# Patient Record
Sex: FEMALE | Race: WHITE | NOT HISPANIC OR LATINO | Employment: FULL TIME | URBAN - METROPOLITAN AREA
[De-identification: names, ages, dates, MRNs, and addresses within clinical notes are randomized per-mention and may not be internally consistent; named-entity substitution may affect disease eponyms.]

---

## 2018-01-01 ENCOUNTER — HOSPITAL ENCOUNTER (INPATIENT)
Facility: HOSPITAL | Age: 58
LOS: 2 days | Discharge: HOME | DRG: 871 | End: 2018-01-03
Attending: EMERGENCY MEDICINE | Admitting: FAMILY MEDICINE
Payer: COMMERCIAL

## 2018-01-01 DIAGNOSIS — J18.9 PNEUMONIA OF RIGHT MIDDLE LOBE DUE TO INFECTIOUS ORGANISM: Primary | ICD-10-CM

## 2018-01-01 DIAGNOSIS — R05.9 COUGH: ICD-10-CM

## 2018-01-01 DIAGNOSIS — R09.02 HYPOXIA: ICD-10-CM

## 2018-01-01 PROBLEM — Z72.0 TOBACCO ABUSE: Status: ACTIVE | Noted: 2018-01-01

## 2018-01-01 PROBLEM — A41.9 SEPSIS: Status: ACTIVE | Noted: 2018-01-01

## 2018-01-01 LAB
ALBUMIN SERPL BCP-MCNC: 3.6 G/DL
ALLENS TEST: ABNORMAL
ALP SERPL-CCNC: 110 U/L
ALT SERPL W/O P-5'-P-CCNC: 15 U/L
ANION GAP SERPL CALC-SCNC: 12 MMOL/L
AST SERPL-CCNC: 23 U/L
BASOPHILS # BLD AUTO: 0.01 K/UL
BASOPHILS NFR BLD: 0.1 %
BILIRUB SERPL-MCNC: 0.6 MG/DL
BILIRUB UR QL STRIP: NEGATIVE
BNP SERPL-MCNC: <10 PG/ML
BUN SERPL-MCNC: 17 MG/DL
CALCIUM SERPL-MCNC: 9.1 MG/DL
CHLORIDE SERPL-SCNC: 99 MMOL/L
CLARITY UR: CLEAR
CO2 SERPL-SCNC: 27 MMOL/L
COLOR UR: YELLOW
CREAT SERPL-MCNC: 1.1 MG/DL
DELSYS: ABNORMAL
DIFFERENTIAL METHOD: ABNORMAL
EOSINOPHIL # BLD AUTO: 0 K/UL
EOSINOPHIL NFR BLD: 0 %
ERYTHROCYTE [DISTWIDTH] IN BLOOD BY AUTOMATED COUNT: 13.2 %
EST. GFR  (AFRICAN AMERICAN): >60 ML/MIN/1.73 M^2
EST. GFR  (NON AFRICAN AMERICAN): 57 ML/MIN/1.73 M^2
FIO2: 21
FLUAV AG SPEC QL IA: NEGATIVE
FLUBV AG SPEC QL IA: NEGATIVE
GLUCOSE SERPL-MCNC: 139 MG/DL
GLUCOSE UR QL STRIP: NEGATIVE
HCO3 UR-SCNC: 21.3 MMOL/L (ref 24–28)
HCT VFR BLD AUTO: 50.2 %
HGB BLD-MCNC: 16.4 G/DL
HGB UR QL STRIP: ABNORMAL
KETONES UR QL STRIP: ABNORMAL
LACTATE SERPL-SCNC: 1 MMOL/L
LEUKOCYTE ESTERASE UR QL STRIP: NEGATIVE
LYMPHOCYTES # BLD AUTO: 1.3 K/UL
LYMPHOCYTES NFR BLD: 14 %
MCH RBC QN AUTO: 30.4 PG
MCHC RBC AUTO-ENTMCNC: 32.7 G/DL
MCV RBC AUTO: 93 FL
MODE: ABNORMAL
MONOCYTES # BLD AUTO: 0.6 K/UL
MONOCYTES NFR BLD: 6.2 %
NEUTROPHILS # BLD AUTO: 7.4 K/UL
NEUTROPHILS NFR BLD: 79.7 %
NITRITE UR QL STRIP: NEGATIVE
PCO2 BLDA: 30.1 MMHG (ref 35–45)
PH SMN: 7.46 [PH] (ref 7.35–7.45)
PH UR STRIP: 5 [PH] (ref 5–8)
PLATELET # BLD AUTO: 132 K/UL
PMV BLD AUTO: 10.9 FL
PO2 BLDA: 46 MMHG (ref 80–100)
POC BE: -3 MMOL/L
POC SATURATED O2: 84 % (ref 95–100)
POTASSIUM SERPL-SCNC: 3.8 MMOL/L
PROT SERPL-MCNC: 7.9 G/DL
PROT UR QL STRIP: NEGATIVE
RBC # BLD AUTO: 5.4 M/UL
SAMPLE: ABNORMAL
SITE: ABNORMAL
SODIUM SERPL-SCNC: 138 MMOL/L
SP GR UR STRIP: <=1.005 (ref 1–1.03)
SPECIMEN SOURCE: NORMAL
TROPONIN I SERPL DL<=0.01 NG/ML-MCNC: <0.006 NG/ML
URN SPEC COLLECT METH UR: ABNORMAL
UROBILINOGEN UR STRIP-ACNC: NEGATIVE EU/DL
WBC # BLD AUTO: 9.24 K/UL

## 2018-01-01 PROCEDURE — 96365 THER/PROPH/DIAG IV INF INIT: CPT

## 2018-01-01 PROCEDURE — 84484 ASSAY OF TROPONIN QUANT: CPT

## 2018-01-01 PROCEDURE — 99285 EMERGENCY DEPT VISIT HI MDM: CPT | Mod: 25

## 2018-01-01 PROCEDURE — 81003 URINALYSIS AUTO W/O SCOPE: CPT

## 2018-01-01 PROCEDURE — 25000003 PHARM REV CODE 250: Performed by: EMERGENCY MEDICINE

## 2018-01-01 PROCEDURE — 99900035 HC TECH TIME PER 15 MIN (STAT)

## 2018-01-01 PROCEDURE — 93010 ELECTROCARDIOGRAM REPORT: CPT | Mod: ,,, | Performed by: INTERNAL MEDICINE

## 2018-01-01 PROCEDURE — 63600175 PHARM REV CODE 636 W HCPCS: Performed by: EMERGENCY MEDICINE

## 2018-01-01 PROCEDURE — 25500020 PHARM REV CODE 255: Performed by: EMERGENCY MEDICINE

## 2018-01-01 PROCEDURE — 82803 BLOOD GASES ANY COMBINATION: CPT

## 2018-01-01 PROCEDURE — 83880 ASSAY OF NATRIURETIC PEPTIDE: CPT

## 2018-01-01 PROCEDURE — 96375 TX/PRO/DX INJ NEW DRUG ADDON: CPT

## 2018-01-01 PROCEDURE — 80053 COMPREHEN METABOLIC PANEL: CPT

## 2018-01-01 PROCEDURE — 85025 COMPLETE CBC W/AUTO DIFF WBC: CPT

## 2018-01-01 PROCEDURE — 96361 HYDRATE IV INFUSION ADD-ON: CPT

## 2018-01-01 PROCEDURE — 36415 COLL VENOUS BLD VENIPUNCTURE: CPT

## 2018-01-01 PROCEDURE — 27000221 HC OXYGEN, UP TO 24 HOURS

## 2018-01-01 PROCEDURE — 83605 ASSAY OF LACTIC ACID: CPT

## 2018-01-01 PROCEDURE — 94640 AIRWAY INHALATION TREATMENT: CPT

## 2018-01-01 PROCEDURE — 87400 INFLUENZA A/B EACH AG IA: CPT

## 2018-01-01 PROCEDURE — 11000001 HC ACUTE MED/SURG PRIVATE ROOM

## 2018-01-01 PROCEDURE — 87040 BLOOD CULTURE FOR BACTERIA: CPT

## 2018-01-01 PROCEDURE — 36600 WITHDRAWAL OF ARTERIAL BLOOD: CPT

## 2018-01-01 PROCEDURE — 25000003 PHARM REV CODE 250: Performed by: NURSE PRACTITIONER

## 2018-01-01 PROCEDURE — 25000242 PHARM REV CODE 250 ALT 637 W/ HCPCS: Performed by: EMERGENCY MEDICINE

## 2018-01-01 RX ORDER — MONTELUKAST SODIUM 10 MG/1
10 TABLET ORAL
COMMUNITY
Start: 2017-02-15 | End: 2018-01-01

## 2018-01-01 RX ORDER — ATORVASTATIN CALCIUM 10 MG/1
20 TABLET, FILM COATED ORAL DAILY
Status: DISCONTINUED | OUTPATIENT
Start: 2018-01-02 | End: 2018-01-03 | Stop reason: HOSPADM

## 2018-01-01 RX ORDER — IPRATROPIUM BROMIDE AND ALBUTEROL SULFATE 2.5; .5 MG/3ML; MG/3ML
3 SOLUTION RESPIRATORY (INHALATION)
COMMUNITY
Start: 2017-06-21 | End: 2018-06-21

## 2018-01-01 RX ORDER — AZITHROMYCIN 250 MG/1
1000 TABLET, FILM COATED ORAL
Status: DISCONTINUED | OUTPATIENT
Start: 2018-01-01 | End: 2018-01-01

## 2018-01-01 RX ORDER — PANTOPRAZOLE SODIUM 40 MG/1
40 TABLET, DELAYED RELEASE ORAL
COMMUNITY
Start: 2017-02-15 | End: 2018-01-01

## 2018-01-01 RX ORDER — CEFTRIAXONE 1 G/1
1 INJECTION, POWDER, FOR SOLUTION INTRAMUSCULAR; INTRAVENOUS
Status: DISCONTINUED | OUTPATIENT
Start: 2018-01-02 | End: 2018-01-01

## 2018-01-01 RX ORDER — IPRATROPIUM BROMIDE AND ALBUTEROL SULFATE 2.5; .5 MG/3ML; MG/3ML
3 SOLUTION RESPIRATORY (INHALATION) EVERY 6 HOURS
Status: DISCONTINUED | OUTPATIENT
Start: 2018-01-02 | End: 2018-01-03 | Stop reason: HOSPADM

## 2018-01-01 RX ORDER — IPRATROPIUM BROMIDE AND ALBUTEROL SULFATE 2.5; .5 MG/3ML; MG/3ML
3 SOLUTION RESPIRATORY (INHALATION)
Status: COMPLETED | OUTPATIENT
Start: 2018-01-01 | End: 2018-01-01

## 2018-01-01 RX ORDER — METHYLPREDNISOLONE SOD SUCC 125 MG
80 VIAL (EA) INJECTION EVERY 8 HOURS
Status: DISCONTINUED | OUTPATIENT
Start: 2018-01-01 | End: 2018-01-03

## 2018-01-01 RX ORDER — CEFTRIAXONE 1 G/1
1 INJECTION, POWDER, FOR SOLUTION INTRAMUSCULAR; INTRAVENOUS
Status: COMPLETED | OUTPATIENT
Start: 2018-01-01 | End: 2018-01-01

## 2018-01-01 RX ORDER — ARFORMOTEROL TARTRATE 15 UG/2ML
15 SOLUTION RESPIRATORY (INHALATION) 2 TIMES DAILY
Status: DISCONTINUED | OUTPATIENT
Start: 2018-01-01 | End: 2018-01-03 | Stop reason: HOSPADM

## 2018-01-01 RX ORDER — ALBUTEROL SULFATE 90 UG/1
2 AEROSOL, METERED RESPIRATORY (INHALATION)
COMMUNITY
Start: 2017-06-15 | End: 2018-01-01

## 2018-01-01 RX ORDER — DEXTROSE 4 G
TABLET,CHEWABLE ORAL
COMMUNITY
Start: 2017-03-16 | End: 2021-09-21

## 2018-01-01 RX ORDER — ACETAMINOPHEN 325 MG/1
650 TABLET ORAL EVERY 8 HOURS PRN
Status: DISCONTINUED | OUTPATIENT
Start: 2018-01-01 | End: 2018-01-03

## 2018-01-01 RX ORDER — DEXTROSE 4 G
TABLET,CHEWABLE ORAL
COMMUNITY
Start: 2017-03-17 | End: 2018-01-01

## 2018-01-01 RX ORDER — GUAIFENESIN 600 MG/1
600 TABLET, EXTENDED RELEASE ORAL 2 TIMES DAILY
Status: DISCONTINUED | OUTPATIENT
Start: 2018-01-01 | End: 2018-01-03 | Stop reason: HOSPADM

## 2018-01-01 RX ORDER — MELOXICAM 15 MG/1
TABLET ORAL
COMMUNITY
Start: 2017-04-14 | End: 2018-01-01

## 2018-01-01 RX ORDER — ONDANSETRON 2 MG/ML
4 INJECTION INTRAMUSCULAR; INTRAVENOUS EVERY 12 HOURS PRN
Status: DISCONTINUED | OUTPATIENT
Start: 2018-01-01 | End: 2018-01-03 | Stop reason: HOSPADM

## 2018-01-01 RX ORDER — FAMOTIDINE 20 MG/1
20 TABLET, FILM COATED ORAL 2 TIMES DAILY
Status: DISCONTINUED | OUTPATIENT
Start: 2018-01-01 | End: 2018-01-03 | Stop reason: HOSPADM

## 2018-01-01 RX ORDER — FAMOTIDINE 20 MG/1
20 TABLET, FILM COATED ORAL
COMMUNITY

## 2018-01-01 RX ORDER — ENOXAPARIN SODIUM 100 MG/ML
40 INJECTION SUBCUTANEOUS EVERY 24 HOURS
Status: DISCONTINUED | OUTPATIENT
Start: 2018-01-01 | End: 2018-01-03 | Stop reason: HOSPADM

## 2018-01-01 RX ORDER — ASPIRIN 81 MG/1
81 TABLET ORAL DAILY
Status: DISCONTINUED | OUTPATIENT
Start: 2018-01-02 | End: 2018-01-03 | Stop reason: HOSPADM

## 2018-01-01 RX ORDER — ACETAMINOPHEN 500 MG
1000 TABLET ORAL
Status: COMPLETED | OUTPATIENT
Start: 2018-01-01 | End: 2018-01-01

## 2018-01-01 RX ORDER — TRIAMCINOLONE ACETONIDE 40 MG/ML
40 INJECTION, SUSPENSION INTRA-ARTICULAR; INTRAMUSCULAR
COMMUNITY
Start: 2017-06-15 | End: 2018-01-01

## 2018-01-01 RX ORDER — FLUTICASONE PROPIONATE AND SALMETEROL 250; 50 UG/1; UG/1
1 POWDER RESPIRATORY (INHALATION)
COMMUNITY
Start: 2017-07-13 | End: 2021-09-21

## 2018-01-01 RX ORDER — BUDESONIDE 0.5 MG/2ML
0.5 INHALANT ORAL EVERY 12 HOURS
Status: DISCONTINUED | OUTPATIENT
Start: 2018-01-01 | End: 2018-01-03 | Stop reason: HOSPADM

## 2018-01-01 RX ORDER — IPRATROPIUM BROMIDE AND ALBUTEROL SULFATE 2.5; .5 MG/3ML; MG/3ML
3 SOLUTION RESPIRATORY (INHALATION) EVERY 6 HOURS
Status: DISCONTINUED | OUTPATIENT
Start: 2018-01-02 | End: 2018-01-01

## 2018-01-01 RX ORDER — LOSARTAN POTASSIUM 100 MG/1
TABLET ORAL
COMMUNITY
Start: 2017-05-19 | End: 2018-01-01

## 2018-01-01 RX ORDER — ASPIRIN 81 MG/1
81 TABLET ORAL DAILY
COMMUNITY
End: 2021-09-21

## 2018-01-01 RX ORDER — SODIUM CHLORIDE 9 MG/ML
INJECTION, SOLUTION INTRAVENOUS CONTINUOUS
Status: ACTIVE | OUTPATIENT
Start: 2018-01-01 | End: 2018-01-03

## 2018-01-01 RX ORDER — METHYLPREDNISOLONE ACETATE 40 MG/ML
40 INJECTION, SUSPENSION INTRA-ARTICULAR; INTRALESIONAL; INTRAMUSCULAR; SOFT TISSUE
COMMUNITY
Start: 2017-06-15 | End: 2018-01-01

## 2018-01-01 RX ORDER — METHYLPREDNISOLONE SOD SUCC 125 MG
125 VIAL (EA) INJECTION
Status: COMPLETED | OUTPATIENT
Start: 2018-01-01 | End: 2018-01-01

## 2018-01-01 RX ADMIN — IOHEXOL 100 ML: 350 INJECTION, SOLUTION INTRAVENOUS at 07:01

## 2018-01-01 RX ADMIN — METHYLPREDNISOLONE SODIUM SUCCINATE 125 MG: 125 INJECTION, POWDER, FOR SOLUTION INTRAMUSCULAR; INTRAVENOUS at 05:01

## 2018-01-01 RX ADMIN — IPRATROPIUM BROMIDE AND ALBUTEROL SULFATE 3 ML: .5; 3 SOLUTION RESPIRATORY (INHALATION) at 05:01

## 2018-01-01 RX ADMIN — SODIUM CHLORIDE: 0.9 INJECTION, SOLUTION INTRAVENOUS at 09:01

## 2018-01-01 RX ADMIN — AZITHROMYCIN MONOHYDRATE 500 MG: 500 INJECTION, POWDER, LYOPHILIZED, FOR SOLUTION INTRAVENOUS at 09:01

## 2018-01-01 RX ADMIN — CEFTRIAXONE SODIUM 1 G: 1 INJECTION, POWDER, FOR SOLUTION INTRAMUSCULAR; INTRAVENOUS at 09:01

## 2018-01-01 RX ADMIN — ACETAMINOPHEN 1000 MG: 500 TABLET ORAL at 05:01

## 2018-01-01 NOTE — ED PROVIDER NOTES
"SCRIBE #1 NOTE: I, Ana Lilia Duke, am scribing for, and in the presence of, Tomas Alicea MD. I have scribed the HPI, ROS, and PEx.    SCRIBE #2 NOTE: I, Lisa Huntley, am scribing for, and in the presence of,  Star Chapa MD. I have scribed the remaining portions of the note not scribed by Scribe #1.     History      Chief Complaint   Patient presents with    Cough     body aches, cough, fever x 1 week       Review of patient's allergies indicates:   Allergen Reactions    Bactrim [sulfamethoxazole-trimethoprim] Other (See Comments)     Chills, fever    Vicodin [hydrocodone-acetaminophen] Itching        HPI   HPI    2018, 5:32 PM   History obtained from the patient      History of Present Illness: Mallika Corbin is a 57 y.o. female patient with PMHx of asthma  presents to the Emergency Department for cough which onset gradually a few days ago. Symptoms are intermittent and moderate in severity. Pt states, "My lungs and my kidneys hurt". Pt reports that her breathing treatments at home are not mitigating sxs. No mitigating or exacerbating factors reported. Associated sxs include myalgias, SOB,  and  fever. Patient denies any CP, leg swelling, calf pain, n/v, wheezing, dizziness, back pain, dysuria, chills, diaphoresis, and all other sxs at this time. No further complaints or concerns at this time.     Arrival mode: Personal vehicle    PCP: Douglas Gary MD       Past Medical History:  Past Medical History:   Diagnosis Date    Asthma     Hiatal hernia     High cholesterol     Mitral valve prolapse     Reflux        Past Surgical History:  Past Surgical History:   Procedure Laterality Date    APPENDECTOMY       SECTION      pyloric stenosis      infant    TOTAL ABDOMINAL HYSTERECTOMY      ovaries in place still.         Family History:  Family History   Problem Relation Age of Onset    Cancer Father     Hypertension Father     Diabetes Father     Diabetes Mother     Hypertension " Mother     Cancer Maternal Grandmother        Social History:  Social History     Social History Main Topics    Smoking status: Current Every Day Smoker     Packs/day: 0.25    Smokeless tobacco: Never Used      Comment: tring to wean self off    Alcohol use No    Drug use: No    Sexual activity: Not given       ROS   Review of Systems   Constitutional: Positive for fever. Negative for chills.   HENT: Negative for sore throat.    Respiratory: Positive for cough and shortness of breath. Negative for wheezing.    Cardiovascular: Negative for chest pain.   Gastrointestinal: Negative for abdominal pain, nausea and vomiting.   Genitourinary: Negative for dysuria.   Musculoskeletal: Positive for myalgias. Negative for back pain.   Skin: Negative for rash.   Neurological: Negative for dizziness, weakness and headaches.   Hematological: Does not bruise/bleed easily.   All other systems reviewed and are negative.    Physical Exam      Initial Vitals [01/01/18 1640]   BP Pulse Resp Temp SpO2   116/74 (!) 127 (!) 24 (!) 100.7 °F (38.2 °C) (!) 88 %      MAP       88          Physical Exam  Nursing Notes and Vital Signs Reviewed.  Constitutional: Patient is in no apparent distress. Well-developed and well-nourished.  Head: Atraumatic. Normocephalic.  Eyes: PERRL. EOM intact. Conjunctivae are not pale. No scleral icterus.  ENT: Mucous membranes are moist. Oropharynx is clear and symmetric.    Neck: Supple. Full ROM. No lymphadenopathy.  Cardiovascular: Regular rate. Regular rhythm. No murmurs, rubs, or gallops. Distal pulses are 2+ and symmetric.  Pulmonary/Chest: No respiratory distress. Clear to auscultation bilaterally. No wheezing or rales.  Abdominal: Soft and non-distended.  There is no tenderness.  No rebound, guarding, or rigidity. Good bowel sounds.  Genitourinary: No CVA tenderness  Musculoskeletal: Moves all extremities. No obvious deformities. No edema. No calf tenderness.  Skin: Warm and dry.  Neurological:   "Alert, awake, and appropriate.  Normal speech.  No acute focal neurological deficits are appreciated.  Psychiatric: Normal affect. Good eye contact. Appropriate in content.    ED Course    Procedures  ED Vital Signs:  Vitals:    01/01/18 1640 01/01/18 1724 01/01/18 1730 01/01/18 1744   BP: 116/74  115/74    Pulse: (!) 127 (!) 116 (!) 115 (!) 115   Resp: (!) 24  (!) 21 (!) 21   Temp: (!) 100.7 °F (38.2 °C)      TempSrc: Oral      SpO2: (!) 88%  (!) 90% (!) 88%   Weight: 79 kg (174 lb 2.6 oz)      Height: 5' 3" (1.6 m)       01/01/18 1749 01/01/18 1755 01/01/18 1935 01/01/18 2116   BP:    (!) 107/58   Pulse: (!) 111 (!) 120 (!) 116 109   Resp: (!) 23 19 19 17   Temp:       TempSrc:       SpO2: (!) 91% (!) 91% (!) 91% (!) 89%   Weight:       Height:           Abnormal Lab Results:  Labs Reviewed   CBC W/ AUTO DIFFERENTIAL - Abnormal; Notable for the following:        Result Value    Hemoglobin 16.4 (*)     Hematocrit 50.2 (*)     Platelets 132 (*)     Gran% 79.7 (*)     Lymph% 14.0 (*)     All other components within normal limits   COMPREHENSIVE METABOLIC PANEL - Abnormal; Notable for the following:     Glucose 139 (*)     eGFR if non  57 (*)     All other components within normal limits   URINALYSIS - Abnormal; Notable for the following:     Specific Gravity, UA <=1.005 (*)     Ketones, UA 2+ (*)     Occult Blood UA Trace (*)     All other components within normal limits   ISTAT PROCEDURE - Abnormal; Notable for the following:     POC PH 7.458 (*)     POC PCO2 30.1 (*)     POC PO2 46 (*)     POC HCO3 21.3 (*)     POC SATURATED O2 84 (*)     All other components within normal limits   CULTURE, BLOOD   CULTURE, BLOOD   CULTURE, RESPIRATORY   INFLUENZA A AND B ANTIGEN   LACTIC ACID, PLASMA   TROPONIN I   B-TYPE NATRIURETIC PEPTIDE        All Lab Results:  Results for orders placed or performed during the hospital encounter of 01/01/18   Influenza antigen Nasopharyngeal Swab   Result Value Ref Range    " Influenza A Ag, EIA Negative Negative    Influenza B Ag, EIA Negative Negative    Flu A & B Source Nasopharyngeal Swab    CBC auto differential   Result Value Ref Range    WBC 9.24 3.90 - 12.70 K/uL    RBC 5.40 4.00 - 5.40 M/uL    Hemoglobin 16.4 (H) 12.0 - 16.0 g/dL    Hematocrit 50.2 (H) 37.0 - 48.5 %    MCV 93 82 - 98 fL    MCH 30.4 27.0 - 31.0 pg    MCHC 32.7 32.0 - 36.0 g/dL    RDW 13.2 11.5 - 14.5 %    Platelets 132 (L) 150 - 350 K/uL    MPV 10.9 9.2 - 12.9 fL    Gran # 7.4 1.8 - 7.7 K/uL    Lymph # 1.3 1.0 - 4.8 K/uL    Mono # 0.6 0.3 - 1.0 K/uL    Eos # 0.0 0.0 - 0.5 K/uL    Baso # 0.01 0.00 - 0.20 K/uL    Gran% 79.7 (H) 38.0 - 73.0 %    Lymph% 14.0 (L) 18.0 - 48.0 %    Mono% 6.2 4.0 - 15.0 %    Eosinophil% 0.0 0.0 - 8.0 %    Basophil% 0.1 0.0 - 1.9 %    Differential Method Automated    Comprehensive metabolic panel   Result Value Ref Range    Sodium 138 136 - 145 mmol/L    Potassium 3.8 3.5 - 5.1 mmol/L    Chloride 99 95 - 110 mmol/L    CO2 27 23 - 29 mmol/L    Glucose 139 (H) 70 - 110 mg/dL    BUN, Bld 17 6 - 20 mg/dL    Creatinine 1.1 0.5 - 1.4 mg/dL    Calcium 9.1 8.7 - 10.5 mg/dL    Total Protein 7.9 6.0 - 8.4 g/dL    Albumin 3.6 3.5 - 5.2 g/dL    Total Bilirubin 0.6 0.1 - 1.0 mg/dL    Alkaline Phosphatase 110 55 - 135 U/L    AST 23 10 - 40 U/L    ALT 15 10 - 44 U/L    Anion Gap 12 8 - 16 mmol/L    eGFR if African American >60 >60 mL/min/1.73 m^2    eGFR if non African American 57 (A) >60 mL/min/1.73 m^2   Lactic acid, plasma   Result Value Ref Range    Lactate (Lactic Acid) 1.0 0.5 - 2.2 mmol/L   Troponin I   Result Value Ref Range    Troponin I <0.006 0.000 - 0.026 ng/mL   B-Type natriuretic peptide (BNP)   Result Value Ref Range    BNP <10 0 - 99 pg/mL   Urinalysis   Result Value Ref Range    Specimen UA Urine, Clean Catch     Color, UA Yellow Yellow, Straw, Felisa    Appearance, UA Clear Clear    pH, UA 5.0 5.0 - 8.0    Specific Gravity, UA <=1.005 (A) 1.005 - 1.030    Protein, UA Negative Negative     Glucose, UA Negative Negative    Ketones, UA 2+ (A) Negative    Bilirubin (UA) Negative Negative    Occult Blood UA Trace (A) Negative    Nitrite, UA Negative Negative    Urobilinogen, UA Negative <2.0 EU/dL    Leukocytes, UA Negative Negative   ISTAT PROCEDURE   Result Value Ref Range    POC PH 7.458 (H) 7.35 - 7.45    POC PCO2 30.1 (L) 35 - 45 mmHg    POC PO2 46 (LL) 80 - 100 mmHg    POC HCO3 21.3 (L) 24 - 28 mmol/L    POC BE -3 -2 to 2 mmol/L    POC SATURATED O2 84 (L) 95 - 100 %    Sample ARTERIAL     Site LB     Allens Test N/A     DelSys Room Air     Mode SPONT     FiO2 21          Imaging Results:  Imaging Results          CTA Chest Non-Coronary (PE Study) (In process)                X-Ray Chest PA And Lateral (Final result)  Result time 01/01/18 17:15:12    Final result by Rj Lorenzo MD (01/01/18 17:15:12)                 Impression:     Focal linear infiltrate/atelectasis in the anteromedial right middle lobe.      Electronically signed by: RJ LORENZO MD  Date:     01/01/18  Time:    17:15              Narrative:    History: Cough      No prior studies.    Normal heart size. There is small opacity in the right infrahilar region on PA view which appears to correspond to a linear infiltrate/atelectasis in the anterior middle lobe on lateral view.                           CT Chest w/ Intravenous Contrast read via statrad:  Impression:  1. Area of  consolidation is seen involving the R middle lobe. Additional small areas of consolidation/groundglass  Opacification seen throughout both lungs. Constellation of findings are suggestive of multifocal pneumonia.   2. Mild bronchial wall thickening is noted, which may represent inflammatory airways disease versus mild pulmonary edema.  3. No evidence of PE.    The EKG was ordered, reviewed, and independently interpreted by the ED provider.  Interpretation time: 17:22  Rate: 122 BPM  Rhythm: sinus tachycardia  Interpretation: ST and T wave abnormality.  No STEMI.           The Emergency Provider reviewed the vital signs and test results, which are outlined above.    ED Discussion   8:42 PM: Discussed case with Osmany ([facility]). Dr. John Wade agrees with current care and management of pt and accepts admission.   Admitting Service: [facility]   Admitting Physician: Dr. John Wade  Admit to: Med Surg    8:42 PM: Re-evaluated pt. I have discussed test results, shared treatment plan, and the need for admission with patient and family at bedside. Pt and family express understanding at this time and agree with all information. All questions answered. Pt and family have no further questions or concerns at this time. Pt is ready for admit.        ED Medication(s):  Medications   ondansetron injection 4 mg (not administered)   promethazine (PHENERGAN) 6.25 mg in dextrose 5 % 50 mL IVPB (not administered)   acetaminophen tablet 650 mg (not administered)   aspirin EC tablet 81 mg (not administered)   atorvastatin tablet 20 mg (not administered)   0.9%  NaCl infusion ( Intravenous New Bag 1/1/18 2106)   enoxaparin injection 40 mg (not administered)   cefTRIAXone injection 1 g (not administered)     And   azithromycin 500 mg in dextrose 5 % 250 mL IVPB (ready to mix system) (not administered)   famotidine tablet 20 mg (not administered)   guaiFENesin 12 hr tablet 600 mg (not administered)   methylPREDNISolone sodium succinate injection 80 mg (not administered)   budesonide nebulizer solution 0.5 mg (not administered)   arformoterol nebulizer solution 15 mcg (not administered)   albuterol-ipratropium 2.5mg-0.5mg/3mL nebulizer solution 3 mL (not administered)   albuterol-ipratropium 2.5mg-0.5mg/3mL nebulizer solution 3 mL (3 mLs Nebulization Given 1/1/18 1755)   methylPREDNISolone sodium succinate injection 125 mg (125 mg Intravenous Given 1/1/18 1723)   acetaminophen tablet 1,000 mg (1,000 mg Oral Given 1/1/18 1723)   cefTRIAXone injection 1 g (1 g Intravenous Given  1/1/18 2104)   azithromycin 500 mg in dextrose 5 % 250 mL IVPB (ready to mix system) (500 mg Intravenous New Bag 1/1/18 2111)   omnipaque 350 iohexol 100 mL (100 mLs Intravenous Given 1/1/18 1924)       New Prescriptions    No medications on file             Medical Decision Making    Medical Decision Making:   Clinical Tests:   Lab Tests: Reviewed and Ordered  Radiological Study: Reviewed and Ordered  Medical Tests: Reviewed and Ordered           Scribe Attestation:   Scribe #1: I performed the above scribed service and the documentation accurately describes the services I performed. I attest to the accuracy of the note.    Attending:   Physician Attestation Statement for Scribe #1: I, Tomas Alicea MD, personally performed the services described in this documentation, as scribed by Ana Lilia Duke, in my presence, and it is both accurate and complete.       Scribe Attestation:   Scribe #2: I performed the above scribed service and the documentation accurately describes the services I performed. I attest to the accuracy of the note.    Attending Attestation:           Physician Attestation for Scribe:    Physician Attestation Statement for Scribe #2: I, Star Chapa MD, reviewed documentation, as scribed by Lisa Huntley in my presence, and it is both accurate and complete. I also acknowledge and confirm the content of the note done by Scribe #1.          Clinical Impression       ICD-10-CM ICD-9-CM   1. Pneumonia of right middle lobe due to infectious organism J18.1 486   2. Cough R05 786.2   3. Hypoxia R09.02 799.02       Disposition:   Disposition: Admitted  Condition: Fair           Star Chapa MD  01/01/18 0877

## 2018-01-01 NOTE — ED NOTES
Patient c/o SOB with nonproductive cough x 1 week, also c/o body aches.    Patient moved to ED room 09, patient assisted onto stretcher and changed into a gown. Patient placed on cardiac monitor, continuous pulse oximetry and automatic blood pressure cuff. Bed placed in low locked position, side rails up x 2, call light is within reach of patient or family, orientation to room and explanation of wait provided to family and patient, alarms set and turned on for monitor and pulse ox, awaiting MD evaluation and orders, will continue to monitor.    Patient identifies self as Mallika Corbin      LOC: The patient is awake, alert and aware of environment with an appropriate affect, the patient is oriented x 3 and speaking appropriately.  APPEARANCE: Patient resting comfortably and in no acute distress, patient is clean and well groomed, patient's clothing is properly fastened.  SKIN: The skin is warm and dry, color consistent with ethnicity, patient has normal skin turgor and moist mucus membranes, skin intact, no breakdown or bruising noted.  MUSCULOSKELETAL: Patient moving all extremities well, no obvious swelling or deformities noted.  RESPIRATORY: Airway is open and patent, respirations are spontaneous, patient has a increased effort and rate, no accessory muscle use noted.  CARDIAC: Patient has a increased rate and sinus rhythm, no periphreal edema noted, capillary refill < 3 seconds.  ABDOMEN: Soft and non tender to palpation, no distention noted.  NEUROLOGIC: PERRL, 3 mm bilaterally, eyes open spontaneously, behavior appropriate to situation, follows commands, facial expression symmetrical, bilateral hand grasp equal and even, purposeful motor response noted, normal sensation in all extremities when touched with a finger.

## 2018-01-02 ENCOUNTER — TELEPHONE (OUTPATIENT)
Dept: PHARMACY | Facility: CLINIC | Age: 58
End: 2018-01-02

## 2018-01-02 PROBLEM — N61.1 ABSCESS OF BREAST: Status: ACTIVE | Noted: 2018-01-02

## 2018-01-02 PROBLEM — J45.901 ASTHMA WITH ACUTE EXACERBATION: Status: ACTIVE | Noted: 2018-01-02

## 2018-01-02 PROBLEM — T14.8XXA WOUND OF SKIN: Status: ACTIVE | Noted: 2018-01-02

## 2018-01-02 LAB
ANION GAP SERPL CALC-SCNC: 11 MMOL/L
BASOPHILS # BLD AUTO: 0.01 K/UL
BASOPHILS NFR BLD: 0.1 %
BUN SERPL-MCNC: 15 MG/DL
CALCIUM SERPL-MCNC: 8.5 MG/DL
CHLORIDE SERPL-SCNC: 104 MMOL/L
CO2 SERPL-SCNC: 21 MMOL/L
CREAT SERPL-MCNC: 0.8 MG/DL
DIFFERENTIAL METHOD: ABNORMAL
EOSINOPHIL # BLD AUTO: 0 K/UL
EOSINOPHIL NFR BLD: 0 %
ERYTHROCYTE [DISTWIDTH] IN BLOOD BY AUTOMATED COUNT: 13.1 %
EST. GFR  (AFRICAN AMERICAN): >60 ML/MIN/1.73 M^2
EST. GFR  (NON AFRICAN AMERICAN): >60 ML/MIN/1.73 M^2
ESTIMATED AVG GLUCOSE: 151 MG/DL
GLUCOSE SERPL-MCNC: 339 MG/DL
HBA1C MFR BLD HPLC: 6.9 %
HCT VFR BLD AUTO: 41.5 %
HGB BLD-MCNC: 13.8 G/DL
LYMPHOCYTES # BLD AUTO: 0.6 K/UL
LYMPHOCYTES NFR BLD: 5.9 %
MAGNESIUM SERPL-MCNC: 1.9 MG/DL
MCH RBC QN AUTO: 30.7 PG
MCHC RBC AUTO-ENTMCNC: 33.3 G/DL
MCV RBC AUTO: 92 FL
MONOCYTES # BLD AUTO: 0.2 K/UL
MONOCYTES NFR BLD: 2 %
NEUTROPHILS # BLD AUTO: 9.1 K/UL
NEUTROPHILS NFR BLD: 92 %
PHOSPHATE SERPL-MCNC: 2.4 MG/DL
PLATELET # BLD AUTO: 117 K/UL
PMV BLD AUTO: 10.8 FL
POTASSIUM SERPL-SCNC: 4.1 MMOL/L
PROCALCITONIN SERPL IA-MCNC: 0.34 NG/ML
RBC # BLD AUTO: 4.49 M/UL
SODIUM SERPL-SCNC: 136 MMOL/L
WBC # BLD AUTO: 9.88 K/UL

## 2018-01-02 PROCEDURE — 36415 COLL VENOUS BLD VENIPUNCTURE: CPT

## 2018-01-02 PROCEDURE — 11000001 HC ACUTE MED/SURG PRIVATE ROOM

## 2018-01-02 PROCEDURE — 83735 ASSAY OF MAGNESIUM: CPT

## 2018-01-02 PROCEDURE — 27000221 HC OXYGEN, UP TO 24 HOURS

## 2018-01-02 PROCEDURE — 84145 PROCALCITONIN (PCT): CPT

## 2018-01-02 PROCEDURE — 94640 AIRWAY INHALATION TREATMENT: CPT

## 2018-01-02 PROCEDURE — 80048 BASIC METABOLIC PNL TOTAL CA: CPT

## 2018-01-02 PROCEDURE — 25000003 PHARM REV CODE 250: Performed by: NURSE PRACTITIONER

## 2018-01-02 PROCEDURE — 85025 COMPLETE CBC W/AUTO DIFF WBC: CPT

## 2018-01-02 PROCEDURE — 84100 ASSAY OF PHOSPHORUS: CPT

## 2018-01-02 PROCEDURE — 83036 HEMOGLOBIN GLYCOSYLATED A1C: CPT

## 2018-01-02 PROCEDURE — 25000003 PHARM REV CODE 250: Performed by: FAMILY MEDICINE

## 2018-01-02 PROCEDURE — 63600175 PHARM REV CODE 636 W HCPCS: Performed by: FAMILY MEDICINE

## 2018-01-02 PROCEDURE — 94761 N-INVAS EAR/PLS OXIMETRY MLT: CPT

## 2018-01-02 PROCEDURE — 25000242 PHARM REV CODE 250 ALT 637 W/ HCPCS: Performed by: NURSE PRACTITIONER

## 2018-01-02 PROCEDURE — 63600175 PHARM REV CODE 636 W HCPCS: Performed by: NURSE PRACTITIONER

## 2018-01-02 RX ADMIN — FAMOTIDINE 20 MG: 20 TABLET, FILM COATED ORAL at 08:01

## 2018-01-02 RX ADMIN — ARFORMOTEROL TARTRATE 15 MCG: 15 SOLUTION RESPIRATORY (INHALATION) at 08:01

## 2018-01-02 RX ADMIN — IPRATROPIUM BROMIDE AND ALBUTEROL SULFATE 3 ML: .5; 3 SOLUTION RESPIRATORY (INHALATION) at 12:01

## 2018-01-02 RX ADMIN — ARFORMOTEROL TARTRATE 15 MCG: 15 SOLUTION RESPIRATORY (INHALATION) at 07:01

## 2018-01-02 RX ADMIN — GUAIFENESIN 600 MG: 600 TABLET, EXTENDED RELEASE ORAL at 09:01

## 2018-01-02 RX ADMIN — METHYLPREDNISOLONE SODIUM SUCCINATE 80 MG: 125 INJECTION, POWDER, FOR SOLUTION INTRAMUSCULAR; INTRAVENOUS at 12:01

## 2018-01-02 RX ADMIN — BUDESONIDE 0.5 MG: 0.5 SUSPENSION RESPIRATORY (INHALATION) at 08:01

## 2018-01-02 RX ADMIN — BUDESONIDE 0.5 MG: 0.5 SUSPENSION RESPIRATORY (INHALATION) at 07:01

## 2018-01-02 RX ADMIN — GUAIFENESIN 600 MG: 600 TABLET, EXTENDED RELEASE ORAL at 12:01

## 2018-01-02 RX ADMIN — ENOXAPARIN SODIUM 40 MG: 100 INJECTION SUBCUTANEOUS at 12:01

## 2018-01-02 RX ADMIN — IPRATROPIUM BROMIDE AND ALBUTEROL SULFATE 3 ML: .5; 3 SOLUTION RESPIRATORY (INHALATION) at 07:01

## 2018-01-02 RX ADMIN — IPRATROPIUM BROMIDE AND ALBUTEROL SULFATE 3 ML: .5; 3 SOLUTION RESPIRATORY (INHALATION) at 01:01

## 2018-01-02 RX ADMIN — ATORVASTATIN CALCIUM 20 MG: 10 TABLET, FILM COATED ORAL at 08:01

## 2018-01-02 RX ADMIN — ENOXAPARIN SODIUM 40 MG: 100 INJECTION SUBCUTANEOUS at 04:01

## 2018-01-02 RX ADMIN — CEFTRIAXONE SODIUM 1000 MG: 1 INJECTION, POWDER, FOR SOLUTION INTRAMUSCULAR; INTRAVENOUS at 09:01

## 2018-01-02 RX ADMIN — IPRATROPIUM BROMIDE AND ALBUTEROL SULFATE 3 ML: .5; 3 SOLUTION RESPIRATORY (INHALATION) at 08:01

## 2018-01-02 RX ADMIN — SODIUM CHLORIDE: 0.9 INJECTION, SOLUTION INTRAVENOUS at 09:01

## 2018-01-02 RX ADMIN — AZITHROMYCIN MONOHYDRATE 500 MG: 500 INJECTION, POWDER, LYOPHILIZED, FOR SOLUTION INTRAVENOUS at 10:01

## 2018-01-02 RX ADMIN — GUAIFENESIN 600 MG: 600 TABLET, EXTENDED RELEASE ORAL at 08:01

## 2018-01-02 RX ADMIN — FAMOTIDINE 20 MG: 20 TABLET, FILM COATED ORAL at 12:01

## 2018-01-02 RX ADMIN — METHYLPREDNISOLONE SODIUM SUCCINATE 80 MG: 125 INJECTION, POWDER, FOR SOLUTION INTRAMUSCULAR; INTRAVENOUS at 03:01

## 2018-01-02 RX ADMIN — METHYLPREDNISOLONE SODIUM SUCCINATE 80 MG: 125 INJECTION, POWDER, FOR SOLUTION INTRAMUSCULAR; INTRAVENOUS at 09:01

## 2018-01-02 RX ADMIN — FAMOTIDINE 20 MG: 20 TABLET, FILM COATED ORAL at 09:01

## 2018-01-02 RX ADMIN — METHYLPREDNISOLONE SODIUM SUCCINATE 80 MG: 125 INJECTION, POWDER, FOR SOLUTION INTRAMUSCULAR; INTRAVENOUS at 05:01

## 2018-01-02 RX ADMIN — ASPIRIN 81 MG: 81 TABLET, COATED ORAL at 08:01

## 2018-01-02 RX ADMIN — BUDESONIDE 0.5 MG: 0.5 SUSPENSION RESPIRATORY (INHALATION) at 12:01

## 2018-01-02 NOTE — H&P
[facility] Hospital Medicine  History & Physical    Patient Name: Mallika Corbin  MRN: [id number]  Admission Date: 2018  Attending Physician: MART PRESCOTT  Primary Care Provider: Douglas Gary MD         Patient information was obtained from patient, past medical records and ER records.     Subjective:     Principal Problem:Sepsis    Chief Complaint:   Chief Complaint   Patient presents with    Cough     body aches, cough, fever x 1 week        HPI: Mallika Corbin is a 57 y.o. female patient with PMHx of asthma  presents to the Emergency Department for cough. Onset few days ago. Symptoms are consent and moderate in severity.Prior treatment at home, nebs not improving symptoms. No mitigating or exacerbating factors reported. Associated sxs include myalgias, SOB,  and  fever. Patient denies any CP, leg swelling, calf pain, n/v, wheezing, dizziness, back pain, dysuria, chills, diaphoresis, and all other sxs at this time. No further complaints or concerns at this time. Patient reports has Never hospitalized for asthma. Has no pulmonary doctor. Smokes daily. CBC, CMP overal stable other than mild hyperglycemia .. Lactic Neg  Urine Neg. ABG note hypoxemia.  CT Chest w/ Intravenous Contrast read via statrad per ER note:  Impression:  1. Area of  consolidation is seen involving the R middle lobe. Additional small areas of consolidation/groundglass  Opacification seen throughout both lungs. Constellation of findings are suggestive of multifocal pneumonia.   2. Mild bronchial wall thickening is noted, which may represent inflammatory airways disease versus mild pulmonary edema.  3. No evidence of PE.  Patient admitted for asthma exacerbation with Sepsis from PNE    Past Medical History:   Diagnosis Date    Asthma     Hiatal hernia     High cholesterol     Mitral valve prolapse     Reflux        Past Surgical History:   Procedure Laterality Date    APPENDECTOMY       SECTION       pyloric stenosis      infant    TOTAL ABDOMINAL HYSTERECTOMY      ovaries in place still.       Review of patient's allergies indicates:   Allergen Reactions    Bactrim [sulfamethoxazole-trimethoprim] Other (See Comments)     Chills, fever    Vicodin [hydrocodone-acetaminophen] Itching       No current facility-administered medications on file prior to encounter.      Current Outpatient Prescriptions on File Prior to Encounter   Medication Sig    albuterol 90 mcg/actuation inhaler Inhale 2 puffs into the lungs daily as needed.    [DISCONTINUED] esomeprazole (NEXIUM) 40 MG capsule Take 40 mg by mouth before breakfast.     Family History     Problem Relation (Age of Onset)    Cancer Father, Maternal Grandmother    Diabetes Father, Mother    Hypertension Father, Mother        Social History Main Topics    Smoking status: Current Every Day Smoker     Packs/day: 0.25    Smokeless tobacco: Never Used      Comment: tring to wean self off    Alcohol use No    Drug use: No    Sexual activity: Not on file     Review of Systems   Constitutional: Positive for fatigue and fever. Negative for chills and diaphoresis.   HENT: Negative for congestion, sore throat and voice change.    Eyes: Negative for photophobia and visual disturbance.   Respiratory: Positive for cough, shortness of breath and wheezing. Negative for stridor.    Cardiovascular: Negative for chest pain and leg swelling.   Gastrointestinal: Negative for abdominal distention, abdominal pain, constipation, diarrhea, nausea and vomiting.   Endocrine: Negative for polydipsia, polyphagia and polyuria.   Genitourinary: Negative for difficulty urinating, dysuria, flank pain, pelvic pain, urgency and vaginal discharge.   Musculoskeletal: Negative for back pain, joint swelling, neck pain and neck stiffness.   Skin: Negative for color change and rash.   Allergic/Immunologic: Negative for immunocompromised state.   Neurological: Negative for dizziness, syncope,  weakness, numbness and headaches.   Hematological: Does not bruise/bleed easily.   Psychiatric/Behavioral: Negative for agitation, behavioral problems and confusion.     Objective:     Vital Signs (Most Recent):  Temp: (!) 100.7 °F (38.2 °C) (01/01/18 1640)  Pulse: (!) 116 (01/01/18 1935)  Resp: 19 (01/01/18 1935)  BP: 115/74 (01/01/18 1730)  SpO2: (!) 91 % (01/01/18 1935) Vital Signs (24h Range):  Temp:  [100.7 °F (38.2 °C)] 100.7 °F (38.2 °C)  Pulse:  [111-127] 116  Resp:  [19-24] 19  SpO2:  [88 %-91 %] 91 %  BP: (115-116)/(74) 115/74     Weight: 79 kg (174 lb 2.6 oz)  Body mass index is 30.85 kg/m².    Physical Exam   Constitutional: She is oriented to person, place, and time. She appears well-developed and well-nourished. She does not have a sickly appearance. She appears ill. No distress.   HENT:   Head: Normocephalic and atraumatic.   Nose: Nose normal.   Eyes: Conjunctivae and EOM are normal. Pupils are equal, round, and reactive to light. No scleral icterus.   Neck: Normal range of motion. Neck supple. No tracheal deviation present.   Cardiovascular: Normal rate, regular rhythm, normal heart sounds and intact distal pulses.    No murmur heard.  Pulmonary/Chest: Effort normal and breath sounds normal. No stridor. No respiratory distress. She has no wheezes. She has no rales.   Abdominal: Soft. Bowel sounds are normal. She exhibits no distension. There is no tenderness. There is no guarding.   Genitourinary:   Genitourinary Comments: deferred   Musculoskeletal: Normal range of motion. She exhibits no edema or deformity.   Neurological: She is alert and oriented to person, place, and time. No cranial nerve deficit.   Skin: Skin is warm and dry. Capillary refill takes 2 to 3 seconds. No rash noted. She is not diaphoretic.   See image   Psychiatric: She has a normal mood and affect. Her behavior is normal. Judgment and thought content normal.   Nursing note and vitals reviewed.        CRANIAL NERVES     CN III,  IV, VI   Pupils are equal, round, and reactive to light.  Extraocular motions are normal.                Significant Labs: All pertinent labs within the past 24 hours have been reviewed.   Results for orders placed or performed during the hospital encounter of 01/01/18   Influenza antigen Nasopharyngeal Swab   Result Value Ref Range    Influenza A Ag, EIA Negative Negative    Influenza B Ag, EIA Negative Negative    Flu A & B Source Nasopharyngeal Swab    CBC auto differential   Result Value Ref Range    WBC 9.24 3.90 - 12.70 K/uL    RBC 5.40 4.00 - 5.40 M/uL    Hemoglobin 16.4 (H) 12.0 - 16.0 g/dL    Hematocrit 50.2 (H) 37.0 - 48.5 %    MCV 93 82 - 98 fL    MCH 30.4 27.0 - 31.0 pg    MCHC 32.7 32.0 - 36.0 g/dL    RDW 13.2 11.5 - 14.5 %    Platelets 132 (L) 150 - 350 K/uL    MPV 10.9 9.2 - 12.9 fL    Gran # 7.4 1.8 - 7.7 K/uL    Lymph # 1.3 1.0 - 4.8 K/uL    Mono # 0.6 0.3 - 1.0 K/uL    Eos # 0.0 0.0 - 0.5 K/uL    Baso # 0.01 0.00 - 0.20 K/uL    Gran% 79.7 (H) 38.0 - 73.0 %    Lymph% 14.0 (L) 18.0 - 48.0 %    Mono% 6.2 4.0 - 15.0 %    Eosinophil% 0.0 0.0 - 8.0 %    Basophil% 0.1 0.0 - 1.9 %    Differential Method Automated    Comprehensive metabolic panel   Result Value Ref Range    Sodium 138 136 - 145 mmol/L    Potassium 3.8 3.5 - 5.1 mmol/L    Chloride 99 95 - 110 mmol/L    CO2 27 23 - 29 mmol/L    Glucose 139 (H) 70 - 110 mg/dL    BUN, Bld 17 6 - 20 mg/dL    Creatinine 1.1 0.5 - 1.4 mg/dL    Calcium 9.1 8.7 - 10.5 mg/dL    Total Protein 7.9 6.0 - 8.4 g/dL    Albumin 3.6 3.5 - 5.2 g/dL    Total Bilirubin 0.6 0.1 - 1.0 mg/dL    Alkaline Phosphatase 110 55 - 135 U/L    AST 23 10 - 40 U/L    ALT 15 10 - 44 U/L    Anion Gap 12 8 - 16 mmol/L    eGFR if African American >60 >60 mL/min/1.73 m^2    eGFR if non African American 57 (A) >60 mL/min/1.73 m^2   Lactic acid, plasma   Result Value Ref Range    Lactate (Lactic Acid) 1.0 0.5 - 2.2 mmol/L   Troponin I   Result Value Ref Range    Troponin I <0.006 0.000 - 0.026  ng/mL   B-Type natriuretic peptide (BNP)   Result Value Ref Range    BNP <10 0 - 99 pg/mL   Urinalysis   Result Value Ref Range    Specimen UA Urine, Clean Catch     Color, UA Yellow Yellow, Straw, Felisa    Appearance, UA Clear Clear    pH, UA 5.0 5.0 - 8.0    Specific Gravity, UA <=1.005 (A) 1.005 - 1.030    Protein, UA Negative Negative    Glucose, UA Negative Negative    Ketones, UA 2+ (A) Negative    Bilirubin (UA) Negative Negative    Occult Blood UA Trace (A) Negative    Nitrite, UA Negative Negative    Urobilinogen, UA Negative <2.0 EU/dL    Leukocytes, UA Negative Negative   ISTAT PROCEDURE   Result Value Ref Range    POC PH 7.458 (H) 7.35 - 7.45    POC PCO2 30.1 (L) 35 - 45 mmHg    POC PO2 46 (LL) 80 - 100 mmHg    POC HCO3 21.3 (L) 24 - 28 mmol/L    POC BE -3 -2 to 2 mmol/L    POC SATURATED O2 84 (L) 95 - 100 %    Sample ARTERIAL     Site LB     Allens Test N/A     DelSys Room Air     Mode SPONT     FiO2 21          Significant Imaging: I have reviewed all pertinent imaging results/findings within the past 24 hours.   Imaging Results          CTA Chest Non-Coronary (PE Study) (In process)                X-Ray Chest PA And Lateral (Final result)  Result time 01/01/18 17:15:12    Final result by Rj Lorenzo MD (01/01/18 17:15:12)                 Impression:     Focal linear infiltrate/atelectasis in the anteromedial right middle lobe.      Electronically signed by: RJ LORENZO MD  Date:     01/01/18  Time:    17:15              Narrative:    History: Cough      No prior studies.    Normal heart size. There is small opacity in the right infrahilar region on PA view which appears to correspond to a linear infiltrate/atelectasis in the anterior middle lobe on lateral view.                              I have personally reviewed the patients labs, imaging, and discussed the patient case in detail with the Er provider    Assessment/Plan:     * Sepsis    Treat PNE, see  PNE  Check urine  Continue IV  hydration  moonitor         Pneumonia of right middle lobe due to infectious organism    -Admit   -IV antx rocephin and aithzyromycin   -WBCs in ER neg, but shift   -Resp therapy consult with sputum culture   -Blood Cultures pending  -Breathing Treatments q6h   -Mucinex 600mg q12  -labs  -Monitor             Asthma with acute exacerbation    Nebs  Treat PNE  Monitor  IV steroid   Consider consult to pulmonary vs op follow up         Hypoxia      resp consult  Nebs  o2  Treat asthma exacerbation and PNE        Wound of skin    See image  Consult to wound care  Pt reports history of skin staph infections.   On rocephin for PNE          Tobacco abuse    Encouraged to stop smoking            VTE Risk Mitigation         Ordered     enoxaparin injection 40 mg  Daily     Route:  Subcutaneous        01/01/18 2103     Medium Risk of VTE  Once      01/01/18 2103     Place sequential compression device  Until discontinued      01/01/18 2103     Medium Risk of VTE  Once      01/01/18 2103     Place sequential compression device  Until discontinued      01/01/18 2103             Ryan Nunez NP  Department of Hospital Medicine   [facility]

## 2018-01-02 NOTE — TELEPHONE ENCOUNTER
Spoke with patient about referral for medications Advair and One touch verio.  I was able to print a coupon for Advair their was no assistance for the test strips but was able to give her a website that had discounted test strips on it.

## 2018-01-02 NOTE — SUBJECTIVE & OBJECTIVE
Past Medical History:   Diagnosis Date    Asthma     Hiatal hernia     High cholesterol     Mitral valve prolapse     Reflux        Past Surgical History:   Procedure Laterality Date    APPENDECTOMY       SECTION      pyloric stenosis      infant    TOTAL ABDOMINAL HYSTERECTOMY      ovaries in place still.       Review of patient's allergies indicates:   Allergen Reactions    Bactrim [sulfamethoxazole-trimethoprim] Other (See Comments)     Chills, fever    Vicodin [hydrocodone-acetaminophen] Itching       No current facility-administered medications on file prior to encounter.      Current Outpatient Prescriptions on File Prior to Encounter   Medication Sig    albuterol 90 mcg/actuation inhaler Inhale 2 puffs into the lungs daily as needed.    [DISCONTINUED] esomeprazole (NEXIUM) 40 MG capsule Take 40 mg by mouth before breakfast.     Family History     Problem Relation (Age of Onset)    Cancer Father, Maternal Grandmother    Diabetes Father, Mother    Hypertension Father, Mother        Social History Main Topics    Smoking status: Current Every Day Smoker     Packs/day: 0.25    Smokeless tobacco: Never Used      Comment: tring to wean self off    Alcohol use No    Drug use: No    Sexual activity: Not on file     Review of Systems   Constitutional: Positive for fatigue and fever. Negative for chills and diaphoresis.   HENT: Negative for congestion, sore throat and voice change.    Eyes: Negative for photophobia and visual disturbance.   Respiratory: Positive for cough, shortness of breath and wheezing. Negative for stridor.    Cardiovascular: Negative for chest pain and leg swelling.   Gastrointestinal: Negative for abdominal distention, abdominal pain, constipation, diarrhea, nausea and vomiting.   Endocrine: Negative for polydipsia, polyphagia and polyuria.   Genitourinary: Negative for difficulty urinating, dysuria, flank pain, pelvic pain, urgency and vaginal discharge.    Musculoskeletal: Negative for back pain, joint swelling, neck pain and neck stiffness.   Skin: Negative for color change and rash.   Allergic/Immunologic: Negative for immunocompromised state.   Neurological: Negative for dizziness, syncope, weakness, numbness and headaches.   Hematological: Does not bruise/bleed easily.   Psychiatric/Behavioral: Negative for agitation, behavioral problems and confusion.     Objective:     Vital Signs (Most Recent):  Temp: (!) 100.7 °F (38.2 °C) (01/01/18 1640)  Pulse: (!) 116 (01/01/18 1935)  Resp: 19 (01/01/18 1935)  BP: 115/74 (01/01/18 1730)  SpO2: (!) 91 % (01/01/18 1935) Vital Signs (24h Range):  Temp:  [100.7 °F (38.2 °C)] 100.7 °F (38.2 °C)  Pulse:  [111-127] 116  Resp:  [19-24] 19  SpO2:  [88 %-91 %] 91 %  BP: (115-116)/(74) 115/74     Weight: 79 kg (174 lb 2.6 oz)  Body mass index is 30.85 kg/m².    Physical Exam   Constitutional: She is oriented to person, place, and time. She appears well-developed and well-nourished. She does not have a sickly appearance. She appears ill. No distress.   HENT:   Head: Normocephalic and atraumatic.   Nose: Nose normal.   Eyes: Conjunctivae and EOM are normal. Pupils are equal, round, and reactive to light. No scleral icterus.   Neck: Normal range of motion. Neck supple. No tracheal deviation present.   Cardiovascular: Normal rate, regular rhythm, normal heart sounds and intact distal pulses.    No murmur heard.  Pulmonary/Chest: Effort normal and breath sounds normal. No stridor. No respiratory distress. She has no wheezes. She has no rales.   Abdominal: Soft. Bowel sounds are normal. She exhibits no distension. There is no tenderness. There is no guarding.   Genitourinary:   Genitourinary Comments: deferred   Musculoskeletal: Normal range of motion. She exhibits no edema or deformity.   Neurological: She is alert and oriented to person, place, and time. No cranial nerve deficit.   Skin: Skin is warm and dry. Capillary refill takes 2 to  3 seconds. No rash noted. She is not diaphoretic.   See image   Psychiatric: She has a normal mood and affect. Her behavior is normal. Judgment and thought content normal.   Nursing note and vitals reviewed.        CRANIAL NERVES     CN III, IV, VI   Pupils are equal, round, and reactive to light.  Extraocular motions are normal.                Significant Labs: All pertinent labs within the past 24 hours have been reviewed.   Results for orders placed or performed during the hospital encounter of 01/01/18   Influenza antigen Nasopharyngeal Swab   Result Value Ref Range    Influenza A Ag, EIA Negative Negative    Influenza B Ag, EIA Negative Negative    Flu A & B Source Nasopharyngeal Swab    CBC auto differential   Result Value Ref Range    WBC 9.24 3.90 - 12.70 K/uL    RBC 5.40 4.00 - 5.40 M/uL    Hemoglobin 16.4 (H) 12.0 - 16.0 g/dL    Hematocrit 50.2 (H) 37.0 - 48.5 %    MCV 93 82 - 98 fL    MCH 30.4 27.0 - 31.0 pg    MCHC 32.7 32.0 - 36.0 g/dL    RDW 13.2 11.5 - 14.5 %    Platelets 132 (L) 150 - 350 K/uL    MPV 10.9 9.2 - 12.9 fL    Gran # 7.4 1.8 - 7.7 K/uL    Lymph # 1.3 1.0 - 4.8 K/uL    Mono # 0.6 0.3 - 1.0 K/uL    Eos # 0.0 0.0 - 0.5 K/uL    Baso # 0.01 0.00 - 0.20 K/uL    Gran% 79.7 (H) 38.0 - 73.0 %    Lymph% 14.0 (L) 18.0 - 48.0 %    Mono% 6.2 4.0 - 15.0 %    Eosinophil% 0.0 0.0 - 8.0 %    Basophil% 0.1 0.0 - 1.9 %    Differential Method Automated    Comprehensive metabolic panel   Result Value Ref Range    Sodium 138 136 - 145 mmol/L    Potassium 3.8 3.5 - 5.1 mmol/L    Chloride 99 95 - 110 mmol/L    CO2 27 23 - 29 mmol/L    Glucose 139 (H) 70 - 110 mg/dL    BUN, Bld 17 6 - 20 mg/dL    Creatinine 1.1 0.5 - 1.4 mg/dL    Calcium 9.1 8.7 - 10.5 mg/dL    Total Protein 7.9 6.0 - 8.4 g/dL    Albumin 3.6 3.5 - 5.2 g/dL    Total Bilirubin 0.6 0.1 - 1.0 mg/dL    Alkaline Phosphatase 110 55 - 135 U/L    AST 23 10 - 40 U/L    ALT 15 10 - 44 U/L    Anion Gap 12 8 - 16 mmol/L    eGFR if African American >60 >60  mL/min/1.73 m^2    eGFR if non African American 57 (A) >60 mL/min/1.73 m^2   Lactic acid, plasma   Result Value Ref Range    Lactate (Lactic Acid) 1.0 0.5 - 2.2 mmol/L   Troponin I   Result Value Ref Range    Troponin I <0.006 0.000 - 0.026 ng/mL   B-Type natriuretic peptide (BNP)   Result Value Ref Range    BNP <10 0 - 99 pg/mL   Urinalysis   Result Value Ref Range    Specimen UA Urine, Clean Catch     Color, UA Yellow Yellow, Straw, Felisa    Appearance, UA Clear Clear    pH, UA 5.0 5.0 - 8.0    Specific Gravity, UA <=1.005 (A) 1.005 - 1.030    Protein, UA Negative Negative    Glucose, UA Negative Negative    Ketones, UA 2+ (A) Negative    Bilirubin (UA) Negative Negative    Occult Blood UA Trace (A) Negative    Nitrite, UA Negative Negative    Urobilinogen, UA Negative <2.0 EU/dL    Leukocytes, UA Negative Negative   ISTAT PROCEDURE   Result Value Ref Range    POC PH 7.458 (H) 7.35 - 7.45    POC PCO2 30.1 (L) 35 - 45 mmHg    POC PO2 46 (LL) 80 - 100 mmHg    POC HCO3 21.3 (L) 24 - 28 mmol/L    POC BE -3 -2 to 2 mmol/L    POC SATURATED O2 84 (L) 95 - 100 %    Sample ARTERIAL     Site LB     Allens Test N/A     DelSys Room Air     Mode SPONT     FiO2 21          Significant Imaging: I have reviewed all pertinent imaging results/findings within the past 24 hours.   Imaging Results          CTA Chest Non-Coronary (PE Study) (In process)                X-Ray Chest PA And Lateral (Final result)  Result time 01/01/18 17:15:12    Final result by Rj Lorenzo MD (01/01/18 17:15:12)                 Impression:     Focal linear infiltrate/atelectasis in the anteromedial right middle lobe.      Electronically signed by: RJ LORENZO MD  Date:     01/01/18  Time:    17:15              Narrative:    History: Cough      No prior studies.    Normal heart size. There is small opacity in the right infrahilar region on PA view which appears to correspond to a linear infiltrate/atelectasis in the anterior middle lobe on  lateral view.

## 2018-01-02 NOTE — ASSESSMENT & PLAN NOTE
See image  Consult to wound care  Pt reports history of skin staph infections.   On rocephin for PNE

## 2018-01-02 NOTE — PLAN OF CARE
SW met with patient in ED. Patient independent with ADL's.  Patient needs assistance with the cost of her medications.  Patient has not been taken her medications correctly due to the cost of her medications.  No other anticipated needs at present. Case mgt to follow up with d/c needs.     01/02/18 1324   Discharge Assessment   Assessment Type Discharge Planning Assessment   Confirmed/corrected address and phone number on facesheet? Yes   Assessment information obtained from? Patient   Current cognitive status: Alert/Oriented   Current Functional Status: Independent   Lives With friend(s)   Able to Return to Prior Arrangements yes   Is patient able to care for self after discharge? Yes   Who are your caregiver(s) and their phone number(s)? abdi rosa (friend) [phone]   Patient's perception of discharge disposition home or selfcare   Readmission Within The Last 30 Days no previous admission in last 30 days   Patient currently being followed by outpatient case management? No   Patient currently receives any other outside agency services? No   Equipment Currently Used at Home none   Do you have any problems affording any of your prescribed medications? Yes   Is the patient taking medications as prescribed? no   Does the patient have transportation home? Yes   Transportation Available car;family or friend will provide   Does the patient receive services at the [facility]? No   Discharge Plan A Home

## 2018-01-02 NOTE — ASSESSMENT & PLAN NOTE
-Admit   -IV antx rocephin and aithzyromycin   -WBCs in ER neg, but shift   -Resp therapy consult with sputum culture   -Blood Cultures pending  -Breathing Treatments q6h   -Mucinex 600mg q12  -labs  -Monitor

## 2018-01-02 NOTE — CONSULTS
"   01/02/18 1050   Pain/Comfort Assessments   Pain Assessment Performed Yes       Number Scale   Presence of Pain denies   Skin   Skin WDL ex   Skin Color/Characteristics redness blanchable   Skin Temperature warm   Jostin Risk Assessment   Sensory Perception 4-->no impairment   Moisture 3-->occasionally moist   Activity 3-->walks occasionally   Mobility 4-->no limitation   Nutrition 3-->adequate   Friction and Shear 3-->no apparent problem   Jostin Score 20       Wound 01/02/18 0800 Abscess lower breast   Date First Assessed/Time First Assessed: 01/02/18 0800   Pre-existing: Yes  Wound Type: Abscess  Side: Left  Orientation: lower  Location: breast  Wound Length (cm): 2  Wound Width (cm): 2   Wound Image    Wound WDL ex   Dressing Appearance no dressing   Drainage Amount none   Wound Base black eschar   Periwound Area redness   Wound Edges fixed   Wound Length (cm) 2   Wound Width (cm) 2   Cleansed W/ soap and water   Interventions barrier applied   Dressing transparent film   Skin Interventions   Pressure Reduction Devices pressure-redistributing mattress utilized;positioning supports utilized   Pressure Reduction Techniques frequent weight shift encouraged;positioned off wounds   Skin Protection adhesive use limited;incontinence pads utilized;skin sealant/moisture barrier applied     Consulted on this 58 y/o F patient for present on admission wound to left lower breast. Patient reports 10 year history of frequent abscesses that "pop up, drain pus, then blood, then scar over". She states she has seen multiple doctors, and been on IV antibiotics with a PICC line, with no help.  At this time, skin assessment completed. Multiple scarred areas noted to inner upper thighs, groins, axilla areas.  Currently, small pimple sized open wound to right upper inner thigh noted with scant sanguinous drainage, patient states pus was noted earlier on underware.  She states the areas are tender.  Left lower breast abscess currently " noted with scabbed area to center measuring 1x1cm, redness extending out 2x2 cm. Area is soft, nonfluctuent, and tender.  Patient states it was hardened and indurated prior to opening and draining.  Cleansed site with saline, and intact georgina wound skin painted with cavilon.  Tegaderm applied to cover scabbed wound.  Wounds to skin are suspicious for Hidradenitis Suppurativa.  No active acute ulcerations noted requiring surgery consult, would recommend patient follows up with dermatology vs general surgery as outpatient for diagnosis and treatment of her condition.  Wound care recommendation for left breast abscess below, consider surgery consult if more abscess form during hospitalization:    Left lower breast abscess:  1. Cleanse with saline  2. Pat dry  3. Paint intact georgina wound skin with cavilon  4. Apply tegaderm  5. Change every 3 days. If wound opens and begins draining purulent drainage again,  Remove tegaderm, pack with nu-gauze packing strip, and cover with mepilex.

## 2018-01-02 NOTE — HPI
Mallika Corbin is a 57 y.o. female patient with PMHx of asthma  presents to the Emergency Department for cough. Onset few days ago. Symptoms are consent and moderate in severity.Prior treatment at home, nebs not improving symptoms. No mitigating or exacerbating factors reported. Associated sxs include myalgias, SOB,  and  fever. Patient denies any CP, leg swelling, calf pain, n/v, wheezing, dizziness, back pain, dysuria, chills, diaphoresis, and all other sxs at this time. No further complaints or concerns at this time. Patient reports has Never hospitalized for asthma. Has no pulmonary doctor. Smokes daily. CBC, CMP overal stable other than mild hyperglycemia .. Lactic Neg  Urine Neg. ABG note hypoxemia.  CT Chest w/ Intravenous Contrast read via statrad per ER note:  Impression:  1. Area of  consolidation is seen involving the R middle lobe. Additional small areas of consolidation/groundglass  Opacification seen throughout both lungs. Constellation of findings are suggestive of multifocal pneumonia.   2. Mild bronchial wall thickening is noted, which may represent inflammatory airways disease versus mild pulmonary edema.  3. No evidence of PE.  Patient admitted for asthma exacerbation with Sepsis from E

## 2018-01-02 NOTE — PLAN OF CARE
Problem: Patient Care Overview  Goal: Plan of Care Review  Outcome: Ongoing (interventions implemented as appropriate)  The patient is a med surge overflow. Normal saline infusing at 125 mL/hr. Pt has had an uneventful night and is resting quietly, will continue to monitor.

## 2018-01-02 NOTE — PLAN OF CARE
Problem: Patient Care Overview  Goal: Plan of Care Review  Outcome: Ongoing (interventions implemented as appropriate)  Pt tx for pneumonia/sepsis. NS at 125 to D/c tomorrow at 1000 after 36 hours infusion. Will titrate o2. No tele monitor. Independent with ambulation. Wound care consult for recurrent abscesses in axilla and under breasts. Tegaderm placed on abscess under Lt breast to be changed every 3 days. Pt has remained pain and injury free this shift. Will continue current care plan and tx. Plan for tentative d/c tomorrow if pt is improved and off the O2 per Dr Alexis.

## 2018-01-03 VITALS
BODY MASS INDEX: 31.79 KG/M2 | TEMPERATURE: 98 F | DIASTOLIC BLOOD PRESSURE: 72 MMHG | RESPIRATION RATE: 18 BRPM | HEIGHT: 63 IN | OXYGEN SATURATION: 92 % | WEIGHT: 179.44 LBS | HEART RATE: 66 BPM | SYSTOLIC BLOOD PRESSURE: 133 MMHG

## 2018-01-03 PROBLEM — A41.9 SEPSIS: Status: RESOLVED | Noted: 2018-01-01 | Resolved: 2018-01-03

## 2018-01-03 PROBLEM — J18.9 PNEUMONIA OF RIGHT MIDDLE LOBE DUE TO INFECTIOUS ORGANISM: Status: RESOLVED | Noted: 2018-01-01 | Resolved: 2018-01-03

## 2018-01-03 PROBLEM — J45.901 ASTHMA WITH ACUTE EXACERBATION: Status: RESOLVED | Noted: 2018-01-02 | Resolved: 2018-01-03

## 2018-01-03 PROBLEM — R09.02 HYPOXIA: Status: RESOLVED | Noted: 2018-01-03 | Resolved: 2018-01-03

## 2018-01-03 PROBLEM — R09.02 HYPOXIA: Status: ACTIVE | Noted: 2018-01-03

## 2018-01-03 LAB
ANION GAP SERPL CALC-SCNC: 8 MMOL/L
BASOPHILS # BLD AUTO: 0.01 K/UL
BASOPHILS NFR BLD: 0.1 %
BUN SERPL-MCNC: 17 MG/DL
CALCIUM SERPL-MCNC: 8.3 MG/DL
CHLORIDE SERPL-SCNC: 107 MMOL/L
CO2 SERPL-SCNC: 22 MMOL/L
CREAT SERPL-MCNC: 0.8 MG/DL
DIFFERENTIAL METHOD: ABNORMAL
EOSINOPHIL # BLD AUTO: 0 K/UL
EOSINOPHIL NFR BLD: 0 %
ERYTHROCYTE [DISTWIDTH] IN BLOOD BY AUTOMATED COUNT: 13.7 %
EST. GFR  (AFRICAN AMERICAN): >60 ML/MIN/1.73 M^2
EST. GFR  (NON AFRICAN AMERICAN): >60 ML/MIN/1.73 M^2
GLUCOSE SERPL-MCNC: 328 MG/DL
HCT VFR BLD AUTO: 39 %
HGB BLD-MCNC: 13 G/DL
LYMPHOCYTES # BLD AUTO: 0.9 K/UL
LYMPHOCYTES NFR BLD: 8 %
MCH RBC QN AUTO: 30.5 PG
MCHC RBC AUTO-ENTMCNC: 33.3 G/DL
MCV RBC AUTO: 92 FL
MONOCYTES # BLD AUTO: 0.4 K/UL
MONOCYTES NFR BLD: 3.6 %
NEUTROPHILS # BLD AUTO: 10.2 K/UL
NEUTROPHILS NFR BLD: 88.6 %
PLATELET # BLD AUTO: 110 K/UL
PMV BLD AUTO: 11 FL
POTASSIUM SERPL-SCNC: 4.6 MMOL/L
RBC # BLD AUTO: 4.26 M/UL
SODIUM SERPL-SCNC: 137 MMOL/L
WBC # BLD AUTO: 11.51 K/UL

## 2018-01-03 PROCEDURE — 80048 BASIC METABOLIC PNL TOTAL CA: CPT

## 2018-01-03 PROCEDURE — 94640 AIRWAY INHALATION TREATMENT: CPT

## 2018-01-03 PROCEDURE — 36415 COLL VENOUS BLD VENIPUNCTURE: CPT

## 2018-01-03 PROCEDURE — 25000003 PHARM REV CODE 250: Performed by: NURSE PRACTITIONER

## 2018-01-03 PROCEDURE — 25000003 PHARM REV CODE 250: Performed by: FAMILY MEDICINE

## 2018-01-03 PROCEDURE — 63600175 PHARM REV CODE 636 W HCPCS: Performed by: FAMILY MEDICINE

## 2018-01-03 PROCEDURE — 63600175 PHARM REV CODE 636 W HCPCS: Performed by: NURSE PRACTITIONER

## 2018-01-03 PROCEDURE — 25000242 PHARM REV CODE 250 ALT 637 W/ HCPCS: Performed by: NURSE PRACTITIONER

## 2018-01-03 PROCEDURE — 85025 COMPLETE CBC W/AUTO DIFF WBC: CPT

## 2018-01-03 RX ORDER — MOXIFLOXACIN HYDROCHLORIDE 400 MG/1
400 TABLET ORAL DAILY
Qty: 5 TABLET | Refills: 0 | Status: SHIPPED | OUTPATIENT
Start: 2018-01-03 | End: 2018-01-08

## 2018-01-03 RX ORDER — ACETAMINOPHEN 325 MG/1
650 TABLET ORAL EVERY 4 HOURS PRN
Status: DISCONTINUED | OUTPATIENT
Start: 2018-01-03 | End: 2018-01-03 | Stop reason: HOSPADM

## 2018-01-03 RX ORDER — PREDNISONE 5 MG/1
TABLET ORAL
Qty: 40 TABLET | Refills: 0 | Status: SHIPPED | OUTPATIENT
Start: 2018-01-03 | End: 2021-09-21

## 2018-01-03 RX ORDER — TRAMADOL HYDROCHLORIDE 50 MG/1
100 TABLET ORAL EVERY 6 HOURS PRN
Status: DISCONTINUED | OUTPATIENT
Start: 2018-01-03 | End: 2018-01-03 | Stop reason: HOSPADM

## 2018-01-03 RX ADMIN — METHYLPREDNISOLONE SODIUM SUCCINATE 80 MG: 125 INJECTION, POWDER, FOR SOLUTION INTRAMUSCULAR; INTRAVENOUS at 05:01

## 2018-01-03 RX ADMIN — BUDESONIDE 0.5 MG: 0.5 SUSPENSION RESPIRATORY (INHALATION) at 08:01

## 2018-01-03 RX ADMIN — SODIUM CHLORIDE: 0.9 INJECTION, SOLUTION INTRAVENOUS at 04:01

## 2018-01-03 RX ADMIN — ARFORMOTEROL TARTRATE 15 MCG: 15 SOLUTION RESPIRATORY (INHALATION) at 08:01

## 2018-01-03 RX ADMIN — IPRATROPIUM BROMIDE AND ALBUTEROL SULFATE 3 ML: .5; 3 SOLUTION RESPIRATORY (INHALATION) at 01:01

## 2018-01-03 RX ADMIN — ASPIRIN 81 MG: 81 TABLET, COATED ORAL at 09:01

## 2018-01-03 RX ADMIN — TRAMADOL HYDROCHLORIDE 100 MG: 50 TABLET, COATED ORAL at 10:01

## 2018-01-03 RX ADMIN — GUAIFENESIN 600 MG: 600 TABLET, EXTENDED RELEASE ORAL at 09:01

## 2018-01-03 RX ADMIN — FAMOTIDINE 20 MG: 20 TABLET, FILM COATED ORAL at 09:01

## 2018-01-03 RX ADMIN — ATORVASTATIN CALCIUM 20 MG: 10 TABLET, FILM COATED ORAL at 09:01

## 2018-01-03 RX ADMIN — METHYLPREDNISOLONE SODIUM SUCCINATE 40 MG: 40 INJECTION, POWDER, FOR SOLUTION INTRAMUSCULAR; INTRAVENOUS at 02:01

## 2018-01-03 RX ADMIN — IPRATROPIUM BROMIDE AND ALBUTEROL SULFATE 3 ML: .5; 3 SOLUTION RESPIRATORY (INHALATION) at 08:01

## 2018-01-03 NOTE — NURSING
Verbalized understanding of discharge paperwork, follow up care, prescriptions, etc.  Denies having any questions.  Patient to get dressed.  States ride will be here before 6.  Will continue to monitor until discharge.

## 2018-01-03 NOTE — CONSULTS
"Consult received  Chart reviewed   Patient assessed  She reports was diagnosed with "pre-diabetes" 8 months ago    She was prescribed diabetes med's which caused GI upset, so she discontinued taking them.  She has med's at home will resume.  She was monitoring her glucose initially 2 times daily,  but the test strips became too expensive, and has not checked recently  Discussed obtaining testing supplies through her insurance carrier or using a non-branded less expensive monitor  Recommended she test less frequently to extend strip supply, alternating pre-meals, bedtime, or post meal and recording results for PCP visits  Provided with a Contour EZ glucose monitor as she reports this is the monitor covered by her insurance.  Reviewed info on target glucose values, hyper/hypoglycemia  Diet recall reveals she is drinking a large coffee with 8 teaspoons of sugar and a regular 16 ounce Sprite daily.  lengthy conversation on importance of eliminating high calorie liquids.  She has several reasons to justify the intake of these.   Made further recommendations on not skipping meals, low-fat, and portion control  She verbalizes understanding   Literature provided  "

## 2018-01-03 NOTE — PLAN OF CARE
Problem: Patient Care Overview  Goal: Plan of Care Review  Outcome: Ongoing (interventions implemented as appropriate)  IV abx given per MD order, Pt remains free of injury, pain managed adequately, no s/s of distress. 24 hour chart check completed. Will continue to monitor.

## 2018-01-03 NOTE — PLAN OF CARE
Problem: Patient Care Overview  Goal: Plan of Care Review  Outcome: Outcome(s) achieved Date Met: 01/03/18  Goals adequately met for discharge.

## 2018-01-03 NOTE — CARE UPDATE
58 y/o asthmatic with cough x 1wk, Tx for R mid lung consolidation (PNA), being Tx with nebs, azith, roceph, and solumedrol,     Pt feeling only slightly better, wants ton know when she can go home.    V/s stable on 3L O2 NC  2-12 WNL, RRR, no increase work of breathing, + exp wheeze, percusion wnl in all fields, BS WNL      No change to Tx plan will attempt to wean O2

## 2018-01-03 NOTE — DISCHARGE INSTRUCTIONS
According to your latest A1c, you have diabetes.  Talk to your primary care about lifestyle changes and perhaps starting metformin after you have recovered from this illness.      Bilateral mediastinal and hilar adenopathy is likely reactive, was seen on your Ct.  The radiologist is asking for a repeat image arranged through your primary care doc.

## 2018-01-04 NOTE — DISCHARGE SUMMARY
[facility] Hospital Medicine  Discharge Summary      Patient Name: Mallika Corbin  MRN: [id number]  Admission Date: 1/1/2018  Hospital Length of Stay: 2 days  Discharge Date and Time: 1/3/2018  5:46 PM  Attending Physician: No att. providers found   Discharging Provider: Ernesto Alexis MD  Primary Care Provider: Douglas Gary MD      HPI:   Mallika Corbin is a 57 y.o. female patient with PMHx of asthma  presents to the Emergency Department for cough. Onset few days ago. Symptoms are consent and moderate in severity.Prior treatment at home, nebs not improving symptoms. No mitigating or exacerbating factors reported. Associated sxs include myalgias, SOB,  and  fever. Patient denies any CP, leg swelling, calf pain, n/v, wheezing, dizziness, back pain, dysuria, chills, diaphoresis, and all other sxs at this time. No further complaints or concerns at this time. Patient reports has Never hospitalized for asthma. Has no pulmonary doctor. Smokes daily. CBC, CMP overal stable other than mild hyperglycemia .. Lactic Neg  Urine Neg. ABG note hypoxemia.  CT Chest w/ Intravenous Contrast read via statrad per ER note:  Impression:  1. Area of  consolidation is seen involving the R middle lobe. Additional small areas of consolidation/groundglass  Opacification seen throughout both lungs. Constellation of findings are suggestive of multifocal pneumonia.   2. Mild bronchial wall thickening is noted, which may represent inflammatory airways disease versus mild pulmonary edema.  3. No evidence of PE.  Patient admitted for asthma exacerbation with Sepsis from PNE    * No surgery found *      Hospital Course:   1/3 - pt feeling much better and asking to go home.  D/c on steroids and antibiotics with PCP f/u.  Pt counseled on new DM Dx, diabetic educator consulted and met with pt b/f d/c.      Examined on day of d/c and suitable to dispo home.      Consults:   Consults         Status Ordering Provider      Inpatient consult to Diabetes educator  Once     Provider:  (Not yet assigned)    Completed RIKY ROSE          No new Assessment & Plan notes have been filed under this hospital service since the last note was generated.  Service: Hospital Medicine    Final Active Diagnoses:    Diagnosis Date Noted POA    Wound of skin [R23.8] 01/02/2018 Yes    Abscess of breast [N61.1] 01/02/2018 Yes    Tobacco abuse [Z72.0] 01/01/2018 Yes      Problems Resolved During this Admission:    Diagnosis Date Noted Date Resolved POA    PRINCIPAL PROBLEM:  Sepsis [A41.9] 01/01/2018 01/03/2018 Yes    Hypoxia [R09.02] 01/03/2018 01/03/2018 Yes    Asthma with acute exacerbation [J45.901] 01/02/2018 01/03/2018 Yes    Pneumonia of right middle lobe due to infectious organism [J18.1] 01/01/2018 01/03/2018 Yes       Discharged Condition: stable    Disposition: Home or Self Care    Follow Up:  Follow-up Information     Douglas Gary MD. Call today.    Specialty:  Family Medicine  Why:  make appt to be seen in 2-3 by your primary care.  address your hospital stay for pneumonia and newly diagnosed diabetes.    Contact information:  [address] [facility] [address] [phone]                 Patient Instructions:     Ambulatory Referral to Pharmacy Assistance   Referral Priority: Routine Referral Type: Consultation   Referral Reason: Specialty Services Required    Number of Visits Requested: 1      Activity as tolerated     Notify your health care provider if you experience any of the following:  temperature >100.4     Notify your health care provider if you experience any of the following:  persistent nausea and vomiting or diarrhea     Notify your health care provider if you experience any of the following:  severe uncontrolled pain     Notify your health care provider if you experience any of the following:  difficulty breathing or increased cough     Notify your health care provider if you experience any  of the following:  severe persistent headache     Notify your health care provider if you experience any of the following:  worsening rash     Notify your health care provider if you experience any of the following:  persistent dizziness, light-headedness, or visual disturbances     Notify your health care provider if you experience any of the following:  increased confusion or weakness         Significant Diagnostic Studies: Labs:   BMP:   Recent Labs  Lab 01/02/18  0646 01/03/18  0554   * 328*    137   K 4.1 4.6    107   CO2 21* 22*   BUN 15 17   CREATININE 0.8 0.8   CALCIUM 8.5* 8.3*   MG 1.9  --    , CMP   Recent Labs  Lab 01/02/18  0646 01/03/18  0554    137   K 4.1 4.6    107   CO2 21* 22*   * 328*   BUN 15 17   CREATININE 0.8 0.8   CALCIUM 8.5* 8.3*   ANIONGAP 11 8   ESTGFRAFRICA >60 >60   EGFRNONAA >60 >60   , CBC   Recent Labs  Lab 01/02/18  0646 01/03/18  0554   WBC 9.88 11.51   HGB 13.8 13.0   HCT 41.5 39.0   * 110*   , INR No results found for: INR, PROTIME, Lipid Panel No results found for: CHOL, HDL, LDLCALC, TRIG, CHOLHDL, Troponin   Recent Labs  Lab 01/01/18  1515   TROPONINI <0.006   , A1C:   Recent Labs  Lab 01/02/18  1617   HGBA1C 6.9*    and All labs within the past 24 hours have been reviewed  Microbiology:   Blood Culture   Lab Results   Component Value Date    LABBLOO No Growth to date 01/01/2018    LABBLOO No Growth to date 01/01/2018    LABBLOO No Growth to date 01/01/2018    and Sputum Culture No results found for: GSRESP, RESPIRATORYC  Radiology: X-Ray: CXR: X-Ray Chest 1 View (CXR): No results found for this visit on 01/01/18. and X-Ray Chest PA and Lateral (CXR):   Results for orders placed or performed during the hospital encounter of 01/01/18   X-Ray Chest PA And Lateral    Narrative    History: Cough      No prior studies.    Normal heart size. There is small opacity in the right infrahilar region on PA view which appears to correspond to a  linear infiltrate/atelectasis in the anterior middle lobe on lateral view.    Impression     Focal linear infiltrate/atelectasis in the anteromedial right middle lobe.      Electronically signed by: DARREL HAAS MD  Date:     01/01/18  Time:    17:15      Imaging Results          CTA Chest Non-Coronary (PE Study) (Final result)  Result time 01/02/18 10:20:03    Final result by Ryan Fox MD (01/02/18 10:20:03)                 Impression:         No evidence of pulmonary embolus .    Patchy areas of infiltrate are seen throughout the lungs greatest within the right middle lobe concerning for multifocal airspace disease/pneumonia.    Mild bronchial wall thickening which can be seen with interstitial process such as infection, edema or inflammation.    Bilateral mediastinal and hilar adenopathy is likely reactive however followup is recommended.    All CT scans at this facility use dose modulation, iterative reconstruction, and/or weight base dosing when appropriate to reduce radiation dose to as low as reasonably achievable.      Electronically signed by: RYAN FOX MD  Date:     01/02/18  Time:    10:20              Narrative:    Clinical data: Chest pain.    Axial CTA images through the chest after administration of contrast was performed according to PE CT angiogram study protocol after administration of 100 cc of Omni 350 contrast.    Findings:    The pulmonary arterial system is well opacified with no evidence of filling defect to suggest pulmonary embolus or thrombus.    Structures of the base of the neck are normal.    The heart and pericardium are unremarkable.    Mediastinal structures including great vessels, trachea, esophagus are intact.    Bilateral mediastinal and hilar adenopathy is likely reactive however followup is recommended.      Patchy areas of infiltrate are seen throughout the lungs greatest within the right middle lobe concerning for multifocal airspace disease/pneumonia.Mild  bronchial wall thickening which can be seen with interstitial process such as infection, edema or inflammation.    Surrounding chest wall structures, visualized abdominal organs, bones are unremarkable.Small hiatal hernia noted.                             X-Ray Chest PA And Lateral (Final result)  Result time 01/01/18 17:15:12    Final result by Rj Lorenzo MD (01/01/18 17:15:12)                 Impression:     Focal linear infiltrate/atelectasis in the anteromedial right middle lobe.      Electronically signed by: JR LORENZO MD  Date:     01/01/18  Time:    17:15              Narrative:    History: Cough      No prior studies.    Normal heart size. There is small opacity in the right infrahilar region on PA view which appears to correspond to a linear infiltrate/atelectasis in the anterior middle lobe on lateral view.                             RADIOLOGY REPORT (Final result)  Result time 01/02/18 14:14:56                  Pending Diagnostic Studies:     None         Medications:  Reconciled Home Medications:   Discharge Medication List as of 1/3/2018  3:56 PM      START taking these medications    Details   moxifloxacin (AVELOX) 400 mg tablet Take 1 tablet (400 mg total) by mouth once daily., Starting Wed 1/3/2018, Until Mon 1/8/2018, Normal      predniSONE (DELTASONE) 5 MG tablet 20mg PO BID x 2d, then 15mg PO BID x 2d, then 10mg PO BID x 2d, then 5mg PO BID x 2d.  QS 8d, Print         CONTINUE these medications which have NOT CHANGED    Details   albuterol 90 mcg/actuation inhaler Inhale 2 puffs into the lungs daily as needed., Until Discontinued, Historical Med      albuterol-ipratropium 2.5mg-0.5mg/3mL (DUO-NEB) 0.5 mg-3 mg(2.5 mg base)/3 mL nebulizer solution 3 mLs., Starting Wed 6/21/2017, Until Thu 6/21/2018, Historical Med      aspirin (ECOTRIN) 81 MG EC tablet Take 81 mg by mouth once daily., Historical Med      ATORVASTATIN CALCIUM (ATORVASTATIN ORAL) Take by mouth., Historical Med       blood sugar diagnostic Strp 1 each., Starting Fri 3/24/2017, Historical Med      blood-glucose meter Misc Twice a day has, Historical Med      famotidine (PEPCID) 20 MG tablet Take 20 mg by mouth., Historical Med      fluticasone-salmeterol 250-50 mcg/dose (ADVAIR) 250-50 mcg/dose diskus inhaler Inhale 1 puff into the lungs., Starting Thu 7/13/2017, Until Fri 7/13/2018, Historical Med      glucose urine test-glucose ox (CLINISTIX) Strp 1 boxes by Misc.(Non-Drug; Combo Route) route daily. Before eating in the morning., Historical Med             Indwelling Lines/Drains at time of discharge:   Lines/Drains/Airways     Airway                 Airway - Non-Surgical 03/07/14 Nasal Cannula 1398 days                Time spent on the discharge of patient: 65 minutes  Patient was seen and examined on the date of discharge and determined to be suitable for discharge.         Ernesto Alexis MD  Department of Hospital Medicine  [facility]

## 2018-01-04 NOTE — NURSING
"Patient decided to keep prescriptions at [facility] on [address] to "avoid the hassle to get them changed".   "

## 2018-01-04 NOTE — PLAN OF CARE
01/04/18 0839   Final Note   Assessment Type Final Discharge Note   Discharge Disposition Home

## 2018-01-04 NOTE — HOSPITAL COURSE
1/3 - pt feeling much better and asking to go home.  D/c on steroids and antibiotics with PCP f/u.  Pt counseled on new DM Dx, diabetic educator consulted and met with pt b/f d/c.      Examined on day of d/c and suitable to dispo home.

## 2018-01-05 ENCOUNTER — PATIENT OUTREACH (OUTPATIENT)
Dept: ADMINISTRATIVE | Facility: CLINIC | Age: 58
End: 2018-01-05

## 2018-01-05 NOTE — PATIENT INSTRUCTIONS
Understanding Sepsis  Sepsis is a severe response the body has to an infection. It is most often caused by bacteria. It is also known as septicemia, or systemic inflammatory response syndrome (SIRS). Sepsis is a medical emergency. It needs to be treated right away.  What is sepsis?  Sepsis is when the body reacts to an infection with a severe inflammatory response. It can be caused by bacteria, fungus, or a virus. Sepsis can cause many kinds of problems around the body. It can lead severe low blood pressure (shock) and organ failure. This can lead to death if not treated.  Sepsis is most common in:  · Infants and older adults  · People with an infection such as pneumonia, meningitis, or a urinary tract infection  · People who have an illness such as cancer, AIDS, or diabetes  · People being treated with chemotherapy medications or radiation  · People who have had a transplant  Symptoms of sepsis  Symptoms of sepsis can include:  · Chills and shaking  · Rapid heartbeat  · Rapid breathing  · Shortness of breath  · Severe nausea or uncontrolled vomiting  · Confusion  · Dizziness  · Decreased urination  · Severe pain, including in the back or joints   Diagnosing sepsis  If your health care provider thinks you may have sepsis, you will be given tests. You may have blood and urine tests. These are done to look for bacteria, viruses, or fungus. You may also have X-rays or other imaging tests. These may be done to look at your organs to locate the source of infection.  Treating sepsis  If you have sepsis, your health care provider will give you antibiotics through a thin, flexible tube put into a vein in your arm (IV). You will also be given fluids through the IV. You may also be given nutrition or other medications through your IV. Your health care provider will talk with you about other treatments you may need. These may include using an oxygen mask or a ventilator to help with breathing. Treatment may last at least 7  to 10 days. Sepsis must be treated in the hospital.  Date Last Reviewed: 7/15/2015  © 2000-2527 [employer] [address] [place] [address], [place] [address]. All rights reserved. This information is not intended as a substitute for professional medical care. Always follow your healthcare professional's instructions.

## 2018-01-05 NOTE — PROGRESS NOTES
Discharge Information     Discharge Date:  1/3/18          Primary Discharge Diagnosis: Sepsis; Pneumonia of L     Sowmya Colón RN attempted to contact patient. No answer. The following message was left for the patient to return the call:  Good morning, I am a nurse calling on behalf of [facility] from the [facility].  This is a Transitional Care Call for Mallika Corbin. When you have a moment please contact us at [phone] or [phone] Monday through Friday, between the hours of 8 am to 4 pm. We look forward to speaking with you. On behalf of [facility] have a nice day.    The patient does not have a scheduled HOSFU appointment within 7-14 days post hospital discharge date 1/3/18.  Unable to route message to PCP staff to assist with HOSFU appointment scheduling - PCP is a non [facility] provider.

## 2018-01-07 LAB
BACTERIA BLD CULT: NORMAL
BACTERIA BLD CULT: NORMAL

## 2019-10-26 ENCOUNTER — HOSPITAL ENCOUNTER (EMERGENCY)
Facility: HOSPITAL | Age: 59
Discharge: HOME | End: 2019-10-26
Attending: EMERGENCY MEDICINE
Payer: COMMERCIAL

## 2019-10-26 VITALS
OXYGEN SATURATION: 99 % | WEIGHT: 176.38 LBS | HEIGHT: 63 IN | TEMPERATURE: 98 F | RESPIRATION RATE: 20 BRPM | SYSTOLIC BLOOD PRESSURE: 130 MMHG | DIASTOLIC BLOOD PRESSURE: 73 MMHG | BODY MASS INDEX: 31.25 KG/M2 | HEART RATE: 92 BPM

## 2019-10-26 DIAGNOSIS — N61.1 BREAST ABSCESS: Primary | ICD-10-CM

## 2019-10-26 LAB
HCV AB SERPL QL IA: NEGATIVE
HIV 1+2 AB+HIV1 P24 AG SERPL QL IA: NEGATIVE

## 2019-10-26 PROCEDURE — 99283 EMERGENCY DEPT VISIT LOW MDM: CPT

## 2019-10-26 PROCEDURE — 86703 HIV-1/HIV-2 1 RESULT ANTBDY: CPT

## 2019-10-26 PROCEDURE — 86803 HEPATITIS C AB TEST: CPT

## 2019-10-26 RX ORDER — MUPIROCIN 20 MG/G
OINTMENT TOPICAL 3 TIMES DAILY
Qty: 30 G | Refills: 0 | Status: SHIPPED | OUTPATIENT
Start: 2019-10-26 | End: 2023-07-14

## 2019-10-26 RX ORDER — CLINDAMYCIN HYDROCHLORIDE 150 MG/1
300 CAPSULE ORAL 4 TIMES DAILY
Qty: 56 CAPSULE | Refills: 0 | Status: SHIPPED | OUTPATIENT
Start: 2019-10-26 | End: 2019-11-02

## 2019-10-26 NOTE — ED PROVIDER NOTES
SCRIBE #1 NOTE: I, Junior Adams, am scribing for, and in the presence of, Lane Lopez MD. I have scribed the entire note.       History     Chief Complaint   Patient presents with    Breast Problem     patient reports open hole to left breast from HS with pain     Review of patient's allergies indicates:   Allergen Reactions    Bactrim [sulfamethoxazole-trimethoprim] Other (See Comments)     Chills, fever    Vicodin [hydrocodone-acetaminophen] Itching         History of Present Illness     HPI    10/26/2019, 1:22 PM  History obtained from the patient      History of Present Illness: Mallika Corbin is a 58 y.o. female patient with a PMHx of Hidradenitis suppurativa, DM, COPD, asthma, hiatal hernia, HLD, and reflux who presents to the Emergency Department for evaluation of left breast problem which onset gradually 1.5 months ago. Pt reports worsening ulcer to left breast. Pt states that she has been dealing with similar ulcers for the past 10 years. Symptoms are worsening and moderate in severity. No mitigating or exacerbating factors reported. No other sxs reported. Patient denies any fever, chills, increased swelling, and all other sxs at this time. Pt denies abx use for her current ulcer. No further complaints or concerns at this time.         Arrival mode: Personal vehicle     PCP: Ernesto Kumari MD        Past Medical History:  Past Medical History:   Diagnosis Date    Asthma     Hiatal hernia     High cholesterol     Mitral valve prolapse     Reflux        Past Surgical History:  Past Surgical History:   Procedure Laterality Date    APPENDECTOMY       SECTION      pyloric stenosis      infant    TOTAL ABDOMINAL HYSTERECTOMY      ovaries in place still.         Family History:  Family History   Problem Relation Age of Onset    Cancer Father     Hypertension Father     Diabetes Father     Diabetes Mother     Hypertension Mother     Cancer Maternal Grandmother         Social History:  Social History     Tobacco Use    Smoking status: Current Every Day Smoker     Packs/day: 0.25    Smokeless tobacco: Never Used    Tobacco comment: tring to wean self off   Substance and Sexual Activity    Alcohol use: No    Drug use: No    Sexual activity: Not on file        Review of Systems     Review of Systems   Constitutional: Negative for chills and fever.   HENT: Negative for sore throat.    Respiratory: Negative for shortness of breath.    Cardiovascular: Negative for chest pain.   Gastrointestinal: Negative for nausea.   Genitourinary: Negative for dysuria.   Musculoskeletal: Negative for back pain.   Skin: Negative for rash.        (+) Ulcer to left breast   Neurological: Negative for weakness and numbness.   Hematological: Does not bruise/bleed easily.   All other systems reviewed and are negative.       Physical Exam     Initial Vitals [10/26/19 1249]   BP Pulse Resp Temp SpO2   130/73 92 20 98.3 °F (36.8 °C) 99 %      MAP       --          Physical Exam  Nursing Notes and Vital Signs Reviewed.  Constitutional: Patient is in no apparent distress. Well-developed and well-nourished.  Head: Atraumatic. Normocephalic.  Eyes: PERRL. EOM intact. Conjunctivae are not pale. No scleral icterus.  ENT: Mucous membranes are moist. Oropharynx is clear and symmetric.    Neck: Supple. Full ROM. No lymphadenopathy.  Cardiovascular: Regular rate. Regular rhythm. No murmurs, rubs, or gallops. Distal pulses are 2+ and symmetric.  Pulmonary/Chest: No respiratory distress. Clear to auscultation bilaterally. No wheezing or rales.  Abdominal: Soft and non-distended.  There is no tenderness.  No rebound, guarding, or rigidity. Good bowel sounds.  Genitourinary: No CVA tenderness  Musculoskeletal: Moves all extremities. No obvious deformities. No edema.   Skin: Warm and dry. 2 cm ulcerated lesion with surrounding erythema to left breast. No drainage or fluctuance  Neurological:  Alert, awake, and  "appropriate.  Normal speech.  No acute focal neurological deficits are appreciated.  Psychiatric: Normal affect. Good eye contact. Appropriate in content.     ED Course   Procedures  ED Vital Signs:  Vitals:    10/26/19 1249   BP: 130/73   Pulse: 92   Resp: 20   Temp: 98.3 °F (36.8 °C)   TempSrc: Oral   SpO2: 99%   Weight: 80 kg (176 lb 5.9 oz)   Height: 5' 3" (1.6 m)                  The Emergency Provider reviewed the vital signs and test results, which are outlined above.     ED Discussion       1:30 PM: Discussed with pt all pertinent ED information. Discussed pt dx and plan of tx. Gave pt all f/u and return to the ED instructions. All questions and concerns were addressed at this time. Pt expresses understanding of information and instructions, and is comfortable with plan to discharge. Pt is stable for discharge.    I discussed with patient and/or family/caretaker that evaluation in the ED does not suggest any emergent or life threatening medical conditions requiring immediate intervention beyond what was provided in the ED, and I believe patient is safe for discharge.  Regardless, an unremarkable evaluation in the ED does not preclude the development or presence of a serious of life threatening condition. As such, patient was instructed to return immediately for any worsening or change in current symptoms.                   ED Medication(s):  Medications - No data to display    New Prescriptions    CLINDAMYCIN (CLEOCIN) 150 MG CAPSULE    Take 2 capsules (300 mg total) by mouth 4 (four) times daily. for 7 days    MUPIROCIN (BACTROBAN) 2 % OINTMENT    Apply topically 3 (three) times daily.       Follow-up Information     Ernesto Kumari MD. Call in 2 days.    Specialty:  Cardiology  Contact information:  [address]  [address]  [facility] [address] [phone]             Surgery. Call in 2 days.    Why:  For wound re-check           [facility]    Specialty:  Emergency " Medicine  Why:  If symptoms worsen  Contact information:  [address]  [facility] [address] [phone]                     Scribe Attestation:   Scribe #1: I performed the above scribed service and the documentation accurately describes the services I performed. I attest to the accuracy of the note.     Attending:   Physician Attestation Statement for Scribe #1: I, Lane Lopez MD, personally performed the services described in this documentation, as scribed by Junior Adams, in my presence, and it is both accurate and complete.           Clinical Impression       ICD-10-CM ICD-9-CM   1. Breast abscess N61.1 611.0       Disposition:   Disposition: Discharged  Condition: Stable         Lane Lopez MD  10/27/19 0703

## 2019-10-28 ENCOUNTER — TELEPHONE (OUTPATIENT)
Dept: SURGERY | Facility: CLINIC | Age: 59
End: 2019-10-28

## 2019-10-28 ENCOUNTER — OFFICE VISIT (OUTPATIENT)
Dept: SURGERY | Facility: CLINIC | Age: 59
End: 2019-10-28
Payer: COMMERCIAL

## 2019-10-28 VITALS
SYSTOLIC BLOOD PRESSURE: 123 MMHG | HEART RATE: 96 BPM | TEMPERATURE: 99 F | BODY MASS INDEX: 30.47 KG/M2 | DIASTOLIC BLOOD PRESSURE: 82 MMHG | WEIGHT: 172 LBS

## 2019-10-28 DIAGNOSIS — S21.002D BREAST WOUND, LEFT, SUBSEQUENT ENCOUNTER: ICD-10-CM

## 2019-10-28 DIAGNOSIS — L73.2 HIDRADENITIS SUPPURATIVA OF LEFT AXILLA: Primary | ICD-10-CM

## 2019-10-28 DIAGNOSIS — L73.2 HIDRADENITIS: ICD-10-CM

## 2019-10-28 PROCEDURE — 99999 PR PBB SHADOW E&M-EST. PATIENT-LVL III: ICD-10-PCS | Mod: PBBFAC,,, | Performed by: NURSE PRACTITIONER

## 2019-10-28 PROCEDURE — 99204 PR OFFICE/OUTPT VISIT, NEW, LEVL IV, 45-59 MIN: ICD-10-PCS | Mod: S$GLB,,, | Performed by: NURSE PRACTITIONER

## 2019-10-28 PROCEDURE — 3008F PR BODY MASS INDEX (BMI) DOCUMENTED: ICD-10-PCS | Mod: CPTII,S$GLB,, | Performed by: NURSE PRACTITIONER

## 2019-10-28 PROCEDURE — 99999 PR PBB SHADOW E&M-EST. PATIENT-LVL III: CPT | Mod: PBBFAC,,, | Performed by: NURSE PRACTITIONER

## 2019-10-28 PROCEDURE — 99204 OFFICE O/P NEW MOD 45 MIN: CPT | Mod: S$GLB,,, | Performed by: NURSE PRACTITIONER

## 2019-10-28 PROCEDURE — 3008F BODY MASS INDEX DOCD: CPT | Mod: CPTII,S$GLB,, | Performed by: NURSE PRACTITIONER

## 2019-10-28 RX ORDER — DOXYCYCLINE 100 MG/1
100 CAPSULE ORAL EVERY 12 HOURS
Qty: 20 CAPSULE | Refills: 0 | Status: SHIPPED | OUTPATIENT
Start: 2019-10-28 | End: 2019-10-29 | Stop reason: SDUPTHER

## 2019-10-28 NOTE — PROGRESS NOTES
Patient ID: Mallika Corbin is a 58 y.o. female.    Chief Complaint: breast abscess (hospital follow-up)    Supervising provider:  Abhijit Rodriguez MD    HPI: Pt noted 6 weeks ago left breast redness and swelling. States when sees this she puts a band aid - antibiotic bandaid- over wound until opens and drains - then usually heals up on its own.     2 years ago was hospitalized with hidradenitis and reports was septic and on IV antibiotics for 3 days in patient.     Patient has not used any topical medications over the wound.  She is to showered over the area and put a band aid over the area.     Pt relates feeling frustrated related to recurrence of the eruptions - states cleocin no longer works for her -     Pt was prescribed cleocin and bactroban ointment by the ED on Sat but they had to order the bactoban. Pt could not afford the antibiotic and did not want to spend money on the same antibiotic that has been used in the past  And not worked.    Pt denies any fever    Menarche - 13 y/o  LMP: hyst 1992- partial hyst  First preg - 21 y/o  G 4  P 4  Hormones- none  Radiation - none  Breast surgery- none    FH: maternal grandmother breast cancer at late 70's    Review of Systems   Constitutional: Negative.  Negative for chills and fever.   HENT: Negative.    Eyes: Negative.    Respiratory: Negative.    Cardiovascular: Negative.    Gastrointestinal: Negative.         No reflux   Endocrine: Negative.    Genitourinary: Negative.    Musculoskeletal: Negative.    Skin: Positive for wound.        Chronic reoccuring skin eruptions axillas and groin areas.    Allergic/Immunologic: Negative.    Neurological: Negative.    Hematological: Negative.  Negative for adenopathy.   Psychiatric/Behavioral: Negative.        Current Outpatient Medications   Medication Sig Dispense Refill    empagliflozin (JARDIANCE) 25 mg Tab Take 25 mg by mouth.      albuterol 90 mcg/actuation inhaler Inhale 2 puffs into the lungs daily as needed.       aspirin (ECOTRIN) 81 MG EC tablet Take 81 mg by mouth once daily.      ATORVASTATIN CALCIUM (ATORVASTATIN ORAL) Take by mouth.      blood sugar diagnostic Strp 1 each.      blood-glucose meter Misc Twice a day has      clindamycin (CLEOCIN) 150 MG capsule Take 2 capsules (300 mg total) by mouth 4 (four) times daily. for 7 days 56 capsule 0    doxycycline (VIBRAMYCIN) 100 MG Cap Take 1 capsule (100 mg total) by mouth every 12 (twelve) hours. for 10 days 20 capsule 0    famotidine (PEPCID) 20 MG tablet Take 20 mg by mouth.      fluticasone-salmeterol 250-50 mcg/dose (ADVAIR) 250-50 mcg/dose diskus inhaler Inhale 1 puff into the lungs.      glucose urine test-glucose ox (CLINISTIX) Strp 1 boxes by Misc.(Non-Drug; Combo Route) route daily. Before eating in the morning.      mupirocin (BACTROBAN) 2 % ointment Apply topically 3 (three) times daily. 30 g 0    predniSONE (DELTASONE) 5 MG tablet 20mg PO BID x 2d, then 15mg PO BID x 2d, then 10mg PO BID x 2d, then 5mg PO BID x 2d.  QS 8d 40 tablet 0    SITagliptan-metformin (JANUMET) 50-1,000 mg per tablet Take 1 tablet by mouth 2 (two) times daily with meals.       No current facility-administered medications for this visit.        Review of patient's allergies indicates:   Allergen Reactions    Bactrim [sulfamethoxazole-trimethoprim] Other (See Comments)     Chills, fever    Vicodin [hydrocodone-acetaminophen] Itching       Past Medical History:   Diagnosis Date    Asthma     Hiatal hernia     High cholesterol     Mitral valve prolapse     Reflux        Past Surgical History:   Procedure Laterality Date    APPENDECTOMY       SECTION      pyloric stenosis      infant    TOTAL ABDOMINAL HYSTERECTOMY      ovaries in place still.       Family History   Problem Relation Age of Onset    Cancer Father     Hypertension Father     Diabetes Father     Diabetes Mother     Hypertension Mother     Cancer Maternal Grandmother        Social History      Socioeconomic History    Marital status: Single     Spouse name: Not on file    Number of children: Not on file    Years of education: Not on file    Highest education level: Not on file   Occupational History    Not on file   Social Needs    Financial resource strain: Not on file    Food insecurity:     Worry: Not on file     Inability: Not on file    Transportation needs:     Medical: Not on file     Non-medical: Not on file   Tobacco Use    Smoking status: Current Every Day Smoker     Packs/day: 0.25    Smokeless tobacco: Never Used    Tobacco comment: tring to wean self off   Substance and Sexual Activity    Alcohol use: No    Drug use: No    Sexual activity: Not on file   Lifestyle    Physical activity:     Days per week: Not on file     Minutes per session: Not on file    Stress: Not on file   Relationships    Social connections:     Talks on phone: Not on file     Gets together: Not on file     Attends Restorationist service: Not on file     Active member of club or organization: Not on file     Attends meetings of clubs or organizations: Not on file     Relationship status: Not on file   Other Topics Concern    Not on file   Social History Narrative    Not on file       Vitals:    10/28/19 1437   BP: 123/82   Pulse: 96   Temp: 98.9 °F (37.2 °C)       Physical Exam   Constitutional: She is oriented to person, place, and time. She appears well-developed and well-nourished.   HENT:   Head: Normocephalic and atraumatic.   Right Ear: External ear normal.   Left Ear: External ear normal.   Mouth/Throat: No oropharyngeal exudate.   Eyes: Pupils are equal, round, and reactive to light. Conjunctivae and EOM are normal. Right eye exhibits no discharge. Left eye exhibits no discharge. No scleral icterus.   Neck: Normal range of motion. Neck supple. No thyromegaly present.   Cardiovascular: Normal rate, regular rhythm and normal heart sounds.   Pulmonary/Chest: Effort normal and breath sounds normal.  Right breast exhibits no inverted nipple, no mass, no nipple discharge, no skin change and no tenderness. Left breast exhibits skin change and tenderness. Left breast exhibits no inverted nipple, no mass and no nipple discharge. No breast discharge.       Abdominal: Soft. Bowel sounds are normal.   Musculoskeletal: Normal range of motion. She exhibits no edema.        Right shoulder: She exhibits no crepitus and normal strength.   Lymphadenopathy:        Head (right side): No submental, no submandibular, no tonsillar, no preauricular, no posterior auricular and no occipital adenopathy present.        Head (left side): No submental, no submandibular, no tonsillar, no preauricular, no posterior auricular and no occipital adenopathy present.     She has no cervical adenopathy.        Right cervical: No superficial cervical and no posterior cervical adenopathy present.       Left cervical: No superficial cervical and no posterior cervical adenopathy present.     She has no axillary adenopathy.        Right: No supraclavicular adenopathy present.        Left: No supraclavicular adenopathy present.   Neurological: She is alert and oriented to person, place, and time. She has normal reflexes.   Skin: Skin is warm and dry. No rash noted. No erythema. No pallor.   Psychiatric: She has a normal mood and affect. Her behavior is normal. Judgment and thought content normal.       Assessment & Plan:  1. Hidradenitis left axilla and left breast  2. Has not started antibiotics- will change antibiotic to doxycycline as recommended in up to date- 100 mg bid for 10 days. Asked pt to soak in clean tub with 1/4 cup bleach - use hydrogen preoxide to clean the open wound and pat dry - cover with band aid - Possible side effects of doxycycline reviewed with pt. Pt instructed on proper administration of med and side effects to report.   3.  Referral to infectious disease for mgt of hidradenitis  4.  Referral to surgeon for possible need for  Incision and drainage of the firm areas in the axilla and at the lateral base of the left breast.    Mammogram performed at [facility] 10/17/18- wnl - recommend pt have her annual when wound heals.     Pt is to let us know if she is unable to afford her antibiotic

## 2019-10-28 NOTE — TELEPHONE ENCOUNTER
----- Message from Joie Blair sent at 10/28/2019  1:02 PM CDT -----  Contact: PATIENT  CALLING TO GET APPOINTMENT FOR BREAST BIOPSY & WOUND CHECK. PLEASE CALL PATIENT @ [phone]. THANKS, URBANO

## 2019-10-29 ENCOUNTER — OFFICE VISIT (OUTPATIENT)
Dept: SURGERY | Facility: CLINIC | Age: 59
End: 2019-10-29
Payer: COMMERCIAL

## 2019-10-29 VITALS
BODY MASS INDEX: 30.48 KG/M2 | DIASTOLIC BLOOD PRESSURE: 73 MMHG | SYSTOLIC BLOOD PRESSURE: 116 MMHG | HEIGHT: 63 IN | TEMPERATURE: 99 F | WEIGHT: 172 LBS | HEART RATE: 84 BPM

## 2019-10-29 DIAGNOSIS — Z12.39 ENCOUNTER FOR SCREENING BREAST EXAMINATION: Primary | ICD-10-CM

## 2019-10-29 DIAGNOSIS — L73.2 HIDRADENITIS AXILLARIS: ICD-10-CM

## 2019-10-29 PROCEDURE — 99999 PR PBB SHADOW E&M-EST. PATIENT-LVL IV: CPT | Mod: PBBFAC,,, | Performed by: SURGERY

## 2019-10-29 PROCEDURE — 99204 PR OFFICE/OUTPT VISIT, NEW, LEVL IV, 45-59 MIN: ICD-10-PCS | Mod: S$GLB,,, | Performed by: SURGERY

## 2019-10-29 PROCEDURE — 99204 OFFICE O/P NEW MOD 45 MIN: CPT | Mod: S$GLB,,, | Performed by: SURGERY

## 2019-10-29 PROCEDURE — 99999 PR PBB SHADOW E&M-EST. PATIENT-LVL IV: ICD-10-PCS | Mod: PBBFAC,,, | Performed by: SURGERY

## 2019-10-29 PROCEDURE — 3008F BODY MASS INDEX DOCD: CPT | Mod: CPTII,S$GLB,, | Performed by: SURGERY

## 2019-10-29 PROCEDURE — 3008F PR BODY MASS INDEX (BMI) DOCUMENTED: ICD-10-PCS | Mod: CPTII,S$GLB,, | Performed by: SURGERY

## 2019-10-29 RX ORDER — DOXYCYCLINE 100 MG/1
100 CAPSULE ORAL EVERY 12 HOURS
Qty: 20 CAPSULE | Refills: 0 | Status: SHIPPED | OUTPATIENT
Start: 2019-10-29 | End: 2019-11-08

## 2019-10-29 NOTE — PROGRESS NOTES
History & Physical  General Surgery      SUBJECTIVE:     Chief Complaint/Reason for Admission: Consult      History of Present Illness:  Patient is a 58 y.o. female presents with Consult    60 year old female with a 10 year history of left inframammary and axillary abscesses for which she was only recently diagnosed hidradenitis.  She has recently developed a left breast skin ulceration for approximately 6 weeks.  She was placed on doxycycline for her left axillary hidradenitis.  She is currently established with Infectious Disease.  She was referred to surgery for evaluation of surgical management of her disease.      Current Outpatient Medications:     albuterol 90 mcg/actuation inhaler, Inhale 2 puffs into the lungs daily as needed., Disp: , Rfl:     aspirin (ECOTRIN) 81 MG EC tablet, Take 81 mg by mouth once daily., Disp: , Rfl:     ATORVASTATIN CALCIUM (ATORVASTATIN ORAL), Take by mouth., Disp: , Rfl:     clindamycin (CLEOCIN) 150 MG capsule, Take 2 capsules (300 mg total) by mouth 4 (four) times daily. for 7 days, Disp: 56 capsule, Rfl: 0    doxycycline (VIBRAMYCIN) 100 MG Cap, Take 1 capsule (100 mg total) by mouth every 12 (twelve) hours. for 10 days, Disp: 20 capsule, Rfl: 0    empagliflozin (JARDIANCE) 25 mg Tab, Take 25 mg by mouth., Disp: , Rfl:     famotidine (PEPCID) 20 MG tablet, Take 20 mg by mouth., Disp: , Rfl:     mupirocin (BACTROBAN) 2 % ointment, Apply topically 3 (three) times daily., Disp: 30 g, Rfl: 0    blood sugar diagnostic Strp, 1 each., Disp: , Rfl:     blood-glucose meter Misc, Twice a day has, Disp: , Rfl:     fluticasone-salmeterol 250-50 mcg/dose (ADVAIR) 250-50 mcg/dose diskus inhaler, Inhale 1 puff into the lungs., Disp: , Rfl:     glucose urine test-glucose ox (CLINISTIX) Strp, 1 boxes by Misc.(Non-Drug; Combo Route) route daily. Before eating in the morning., Disp: , Rfl:     predniSONE (DELTASONE) 5 MG tablet, 20mg PO BID x 2d, then 15mg PO BID x 2d, then 10mg  "PO BID x 2d, then 5mg PO BID x 2d.  QS 8d, Disp: 40 tablet, Rfl: 0    SITagliptan-metformin (JANUMET) 50-1,000 mg per tablet, Take 1 tablet by mouth 2 (two) times daily with meals., Disp: , Rfl:     Review of patient's allergies indicates:   Allergen Reactions    Bactrim [sulfamethoxazole-trimethoprim] Other (See Comments)     Chills, fever    Vicodin [hydrocodone-acetaminophen] Itching       Past Medical History:   Diagnosis Date    Asthma     Hiatal hernia     High cholesterol     Mitral valve prolapse     Reflux      Past Surgical History:   Procedure Laterality Date    APPENDECTOMY       SECTION      pyloric stenosis      infant    TOTAL ABDOMINAL HYSTERECTOMY      ovaries in place still.     Family History   Problem Relation Age of Onset    Cancer Father     Hypertension Father     Diabetes Father     Diabetes Mother     Hypertension Mother     Cancer Maternal Grandmother      Social History     Tobacco Use    Smoking status: Current Every Day Smoker     Packs/day: 0.25    Smokeless tobacco: Never Used    Tobacco comment: tring to wean self off   Substance Use Topics    Alcohol use: No    Drug use: No        Review of Systems:  Review of Systems   Constitutional: Negative for activity change, fatigue, fever and unexpected weight change.   Respiratory: Negative for chest tightness.    Cardiovascular: Negative for chest pain.   Gastrointestinal: Negative for abdominal pain.   Musculoskeletal: Negative for back pain.   Skin: Positive for wound. Negative for color change.   Allergic/Immunologic: Negative for immunocompromised state.   Neurological: Negative for headaches.   Hematological: Negative for adenopathy. Does not bruise/bleed easily.   Psychiatric/Behavioral: The patient is not nervous/anxious.        OBJECTIVE:     Vital Signs (Most Recent)  /73   Pulse 84   Temp 98.6 °F (37 °C)   Ht 5' 3" (1.6 m)   Wt 78 kg (172 lb)   BMI 30.47 kg/m²     Physical Exam:   Physical " Exam   Constitutional: She appears well-developed and well-nourished. No distress.   HENT:   Head: Normocephalic and atraumatic.   Eyes: EOM are normal.   Neck: Neck supple.   Cardiovascular: Normal rate and regular rhythm.   Pulmonary/Chest: Effort normal. Right breast exhibits no inverted nipple, no mass, no nipple discharge, no skin change and no tenderness. Left breast exhibits no inverted nipple, no mass, no nipple discharge, no skin change and no tenderness. Breasts are symmetrical.       Musculoskeletal: Normal range of motion.   Lymphadenopathy:     She has no axillary adenopathy.   Skin: Skin is warm.   Psychiatric: She has a normal mood and affect.         ASSESSMENT/PLAN:     Encounter for screening breast examination  -     Mammo Digital Screening Bilat w/ Scotty; Future; Expected date: 10/29/2019    Hidradenitis axillaris    Other orders  -     doxycycline (VIBRAMYCIN) 100 MG Cap; Take 1 capsule (100 mg total) by mouth every 12 (twelve) hours. for 10 days  Dispense: 20 capsule; Refill: 0      Agreed with proceeding with screening mammography.  Doxycycline sent to pharmacy.  If screening mammography shows no concerning findings, will proceed with surgical excision of her left axillary hidradenitis.    Christine Chavarria

## 2019-10-31 PROBLEM — L73.2 HIDRADENITIS AXILLARIS: Status: ACTIVE | Noted: 2019-10-31

## 2019-11-08 ENCOUNTER — HOSPITAL ENCOUNTER (OUTPATIENT)
Dept: RADIOLOGY | Facility: HOSPITAL | Age: 59
Discharge: HOME | End: 2019-11-08
Attending: SURGERY
Payer: COMMERCIAL

## 2019-11-08 VITALS — BODY MASS INDEX: 30.46 KG/M2 | WEIGHT: 171.94 LBS | HEIGHT: 63 IN

## 2019-11-08 DIAGNOSIS — Z12.39 ENCOUNTER FOR SCREENING BREAST EXAMINATION: ICD-10-CM

## 2019-11-08 PROCEDURE — 77067 MAMMO DIGITAL SCREENING BILAT WITH TOMOSYNTHESIS_CAD: ICD-10-PCS | Mod: 26,,, | Performed by: RADIOLOGY

## 2019-11-08 PROCEDURE — 77063 BREAST TOMOSYNTHESIS BI: CPT | Mod: 26,,, | Performed by: RADIOLOGY

## 2019-11-08 PROCEDURE — 77063 MAMMO DIGITAL SCREENING BILAT WITH TOMOSYNTHESIS_CAD: ICD-10-PCS | Mod: 26,,, | Performed by: RADIOLOGY

## 2019-11-08 PROCEDURE — 77067 SCR MAMMO BI INCL CAD: CPT | Mod: TC

## 2019-11-08 PROCEDURE — 77067 SCR MAMMO BI INCL CAD: CPT | Mod: 26,,, | Performed by: RADIOLOGY

## 2021-09-21 PROBLEM — B95.8 STAPHYLOCOCCAL SKIN DISORDER: Status: ACTIVE | Noted: 2017-03-16

## 2021-09-21 PROBLEM — J30.9 ALLERGIC SINUSITIS: Status: ACTIVE | Noted: 2017-02-15

## 2021-09-21 PROBLEM — E78.5 HLD (HYPERLIPIDEMIA): Status: ACTIVE | Noted: 2018-12-14

## 2021-09-21 PROBLEM — M48.061 LUMBAR SPINAL STENOSIS: Status: ACTIVE | Noted: 2017-02-15

## 2021-09-21 PROBLEM — K44.9 HIATAL HERNIA: Status: ACTIVE | Noted: 2017-02-15

## 2021-09-21 PROBLEM — M79.89 LEFT LEG SWELLING: Status: ACTIVE | Noted: 2021-09-21

## 2021-09-21 PROBLEM — L08.9 STAPHYLOCOCCAL SKIN DISORDER: Status: ACTIVE | Noted: 2017-03-16

## 2021-09-21 PROBLEM — M25.50 MULTIPLE JOINT PAIN: Status: ACTIVE | Noted: 2021-05-06

## 2021-09-21 PROBLEM — F17.200 TOBACCO DEPENDENCE: Status: ACTIVE | Noted: 2021-05-06

## 2021-09-21 PROBLEM — I25.10 CORONARY ARTERY DISEASE INVOLVING NATIVE CORONARY ARTERY OF NATIVE HEART WITHOUT ANGINA PECTORIS: Status: ACTIVE | Noted: 2021-05-06

## 2021-09-21 PROBLEM — B35.3 TINEA PEDIS OF BOTH FEET: Status: ACTIVE | Noted: 2021-05-06

## 2021-09-21 PROBLEM — J45.20 MILD INTERMITTENT ASTHMA WITHOUT COMPLICATION: Status: ACTIVE | Noted: 2017-02-15

## 2021-09-21 PROBLEM — E11.65 TYPE 2 DIABETES MELLITUS WITH HYPERGLYCEMIA, WITHOUT LONG-TERM CURRENT USE OF INSULIN: Status: ACTIVE | Noted: 2021-09-21

## 2021-09-21 PROBLEM — K21.9 GASTROESOPHAGEAL REFLUX DISEASE WITHOUT ESOPHAGITIS: Status: ACTIVE | Noted: 2017-02-15

## 2021-09-21 PROBLEM — I10 ESSENTIAL HYPERTENSION: Status: ACTIVE | Noted: 2017-02-15

## 2021-09-21 PROBLEM — M17.11 PRIMARY OSTEOARTHRITIS OF RIGHT KNEE: Status: ACTIVE | Noted: 2017-02-15

## 2023-07-14 ENCOUNTER — PATIENT MESSAGE (OUTPATIENT)
Dept: INTERNAL MEDICINE | Facility: CLINIC | Age: 63
End: 2023-07-14

## 2023-07-14 ENCOUNTER — TELEPHONE (OUTPATIENT)
Dept: PHARMACY | Facility: CLINIC | Age: 63
End: 2023-07-14
Payer: COMMERCIAL

## 2023-07-14 ENCOUNTER — OFFICE VISIT (OUTPATIENT)
Dept: INTERNAL MEDICINE | Facility: CLINIC | Age: 63
End: 2023-07-14
Payer: COMMERCIAL

## 2023-07-14 VITALS
HEART RATE: 95 BPM | BODY MASS INDEX: 31.84 KG/M2 | HEIGHT: 63 IN | TEMPERATURE: 98 F | WEIGHT: 179.69 LBS | DIASTOLIC BLOOD PRESSURE: 76 MMHG | OXYGEN SATURATION: 96 % | RESPIRATION RATE: 19 BRPM | SYSTOLIC BLOOD PRESSURE: 122 MMHG

## 2023-07-14 DIAGNOSIS — L73.2 HIDRADENITIS SUPPURATIVA: ICD-10-CM

## 2023-07-14 DIAGNOSIS — E11.59 HYPERTENSION COMPLICATING DIABETES: ICD-10-CM

## 2023-07-14 DIAGNOSIS — N32.81 OVERACTIVE BLADDER: ICD-10-CM

## 2023-07-14 DIAGNOSIS — R30.0 DYSURIA: ICD-10-CM

## 2023-07-14 DIAGNOSIS — Z00.01 ENCOUNTER FOR WELL ADULT EXAM WITH ABNORMAL FINDINGS: Primary | ICD-10-CM

## 2023-07-14 DIAGNOSIS — E66.9 TYPE 2 DIABETES MELLITUS WITH OBESITY: ICD-10-CM

## 2023-07-14 DIAGNOSIS — Z13.220 ENCOUNTER FOR LIPID SCREENING FOR CARDIOVASCULAR DISEASE: ICD-10-CM

## 2023-07-14 DIAGNOSIS — E11.65 TYPE 2 DIABETES MELLITUS WITH HYPERGLYCEMIA, WITHOUT LONG-TERM CURRENT USE OF INSULIN: Chronic | ICD-10-CM

## 2023-07-14 DIAGNOSIS — I15.2 HYPERTENSION COMPLICATING DIABETES: ICD-10-CM

## 2023-07-14 DIAGNOSIS — E11.69 TYPE 2 DIABETES MELLITUS WITH OBESITY: ICD-10-CM

## 2023-07-14 DIAGNOSIS — Z12.31 BREAST CANCER SCREENING BY MAMMOGRAM: ICD-10-CM

## 2023-07-14 DIAGNOSIS — Z80.52 FAMILY HISTORY OF BLADDER CANCER: ICD-10-CM

## 2023-07-14 DIAGNOSIS — Z01.00 DIABETIC EYE EXAM: ICD-10-CM

## 2023-07-14 DIAGNOSIS — E78.5 DYSLIPIDEMIA ASSOCIATED WITH TYPE 2 DIABETES MELLITUS: ICD-10-CM

## 2023-07-14 DIAGNOSIS — Z59.86 PATIENT CANNOT AFFORD MEDICATIONS: ICD-10-CM

## 2023-07-14 DIAGNOSIS — N39.3 STRESS INCONTINENCE OF URINE: ICD-10-CM

## 2023-07-14 DIAGNOSIS — Z13.6 ENCOUNTER FOR LIPID SCREENING FOR CARDIOVASCULAR DISEASE: ICD-10-CM

## 2023-07-14 DIAGNOSIS — Z12.2 ENCOUNTER FOR SCREENING FOR LUNG CANCER: ICD-10-CM

## 2023-07-14 DIAGNOSIS — E11.69 DYSLIPIDEMIA ASSOCIATED WITH TYPE 2 DIABETES MELLITUS: ICD-10-CM

## 2023-07-14 DIAGNOSIS — F17.211 TOBACCO DEPENDENCE DUE TO CIGARETTES, IN REMISSION: Chronic | ICD-10-CM

## 2023-07-14 DIAGNOSIS — J41.0 SIMPLE CHRONIC BRONCHITIS: ICD-10-CM

## 2023-07-14 DIAGNOSIS — J44.89 ASTHMA-COPD OVERLAP SYNDROME: ICD-10-CM

## 2023-07-14 DIAGNOSIS — E11.9 DIABETIC EYE EXAM: ICD-10-CM

## 2023-07-14 PROBLEM — Z72.0 TOBACCO ABUSE: Status: RESOLVED | Noted: 2018-01-01 | Resolved: 2023-07-14

## 2023-07-14 LAB
ALBUMIN/CREAT UR: NORMAL UG/MG (ref 0–30)
BACTERIA #/AREA URNS HPF: ABNORMAL /HPF
BILIRUB UR QL STRIP: NEGATIVE
CLARITY UR: CLEAR
COLOR UR: YELLOW
CREAT UR-MCNC: 46 MG/DL (ref 15–325)
EPITH CASTS #/AREA URNS LPF: 1 /LPF
GLUCOSE SERPL-MCNC: 189 MG/DL (ref 70–110)
GLUCOSE UR QL STRIP: ABNORMAL
HGB UR QL STRIP: ABNORMAL
KETONES UR QL STRIP: NEGATIVE
LEUKOCYTE ESTERASE UR QL STRIP: NEGATIVE
MICROALBUMIN UR DL<=1MG/L-MCNC: <5 UG/ML
MICROSCOPIC COMMENT: ABNORMAL
NITRITE UR QL STRIP: NEGATIVE
PH UR STRIP: 6 [PH] (ref 5–8)
PROT UR QL STRIP: NEGATIVE
RBC #/AREA URNS HPF: 0 /HPF (ref 0–4)
SP GR UR STRIP: 1.01 (ref 1–1.03)
URN SPEC COLLECT METH UR: ABNORMAL
WBC #/AREA URNS HPF: 2 /HPF (ref 0–5)
YEAST URNS QL MICRO: ABNORMAL

## 2023-07-14 PROCEDURE — 99204 PR OFFICE/OUTPT VISIT, NEW, LEVL IV, 45-59 MIN: ICD-10-PCS | Mod: 25,S$GLB,, | Performed by: FAMILY MEDICINE

## 2023-07-14 PROCEDURE — 99999 PR PBB SHADOW E&M-EST. PATIENT-LVL V: ICD-10-PCS | Mod: PBBFAC,,, | Performed by: FAMILY MEDICINE

## 2023-07-14 PROCEDURE — 3052F PR MOST RECENT HEMOGLOBIN A1C LEVEL 8.0 - < 9.0%: ICD-10-PCS | Mod: CPTII,S$GLB,, | Performed by: FAMILY MEDICINE

## 2023-07-14 PROCEDURE — 3066F NEPHROPATHY DOC TX: CPT | Mod: CPTII,S$GLB,, | Performed by: FAMILY MEDICINE

## 2023-07-14 PROCEDURE — 99396 PR PREVENTIVE VISIT,EST,40-64: ICD-10-PCS | Mod: 25,S$GLB,, | Performed by: FAMILY MEDICINE

## 2023-07-14 PROCEDURE — 82962 GLUCOSE BLOOD TEST: CPT | Mod: S$GLB,,, | Performed by: FAMILY MEDICINE

## 2023-07-14 PROCEDURE — 3074F SYST BP LT 130 MM HG: CPT | Mod: CPTII,S$GLB,, | Performed by: FAMILY MEDICINE

## 2023-07-14 PROCEDURE — 3061F PR NEG MICROALBUMINURIA RESULT DOCUMENTED/REVIEW: ICD-10-PCS | Mod: CPTII,S$GLB,, | Performed by: FAMILY MEDICINE

## 2023-07-14 PROCEDURE — 99204 OFFICE O/P NEW MOD 45 MIN: CPT | Mod: 25,S$GLB,, | Performed by: FAMILY MEDICINE

## 2023-07-14 PROCEDURE — 3008F PR BODY MASS INDEX (BMI) DOCUMENTED: ICD-10-PCS | Mod: CPTII,S$GLB,, | Performed by: FAMILY MEDICINE

## 2023-07-14 PROCEDURE — 4010F PR ACE/ARB THEARPY RXD/TAKEN: ICD-10-PCS | Mod: CPTII,S$GLB,, | Performed by: FAMILY MEDICINE

## 2023-07-14 PROCEDURE — 3078F DIAST BP <80 MM HG: CPT | Mod: CPTII,S$GLB,, | Performed by: FAMILY MEDICINE

## 2023-07-14 PROCEDURE — 82962 POCT GLUCOSE, HAND-HELD DEVICE: ICD-10-PCS | Mod: S$GLB,,, | Performed by: FAMILY MEDICINE

## 2023-07-14 PROCEDURE — 3061F NEG MICROALBUMINURIA REV: CPT | Mod: CPTII,S$GLB,, | Performed by: FAMILY MEDICINE

## 2023-07-14 PROCEDURE — 4010F ACE/ARB THERAPY RXD/TAKEN: CPT | Mod: CPTII,S$GLB,, | Performed by: FAMILY MEDICINE

## 2023-07-14 PROCEDURE — 3078F PR MOST RECENT DIASTOLIC BLOOD PRESSURE < 80 MM HG: ICD-10-PCS | Mod: CPTII,S$GLB,, | Performed by: FAMILY MEDICINE

## 2023-07-14 PROCEDURE — 3074F PR MOST RECENT SYSTOLIC BLOOD PRESSURE < 130 MM HG: ICD-10-PCS | Mod: CPTII,S$GLB,, | Performed by: FAMILY MEDICINE

## 2023-07-14 PROCEDURE — 3052F HG A1C>EQUAL 8.0%<EQUAL 9.0%: CPT | Mod: CPTII,S$GLB,, | Performed by: FAMILY MEDICINE

## 2023-07-14 PROCEDURE — 99396 PREV VISIT EST AGE 40-64: CPT | Mod: 25,S$GLB,, | Performed by: FAMILY MEDICINE

## 2023-07-14 PROCEDURE — 99999 PR PBB SHADOW E&M-EST. PATIENT-LVL V: CPT | Mod: PBBFAC,,, | Performed by: FAMILY MEDICINE

## 2023-07-14 PROCEDURE — 82570 ASSAY OF URINE CREATININE: CPT | Performed by: FAMILY MEDICINE

## 2023-07-14 PROCEDURE — 3008F BODY MASS INDEX DOCD: CPT | Mod: CPTII,S$GLB,, | Performed by: FAMILY MEDICINE

## 2023-07-14 PROCEDURE — 81000 URINALYSIS NONAUTO W/SCOPE: CPT | Performed by: FAMILY MEDICINE

## 2023-07-14 PROCEDURE — 3066F PR DOCUMENTATION OF TREATMENT FOR NEPHROPATHY: ICD-10-PCS | Mod: CPTII,S$GLB,, | Performed by: FAMILY MEDICINE

## 2023-07-14 RX ORDER — ALBUTEROL SULFATE 90 UG/1
2 AEROSOL, METERED RESPIRATORY (INHALATION) EVERY 6 HOURS PRN
Qty: 18 G | Refills: 11 | Status: SHIPPED | OUTPATIENT
Start: 2023-07-14 | End: 2023-07-28 | Stop reason: SDUPTHER

## 2023-07-14 RX ORDER — PROMETHAZINE HYDROCHLORIDE AND DEXTROMETHORPHAN HYDROBROMIDE 6.25; 15 MG/5ML; MG/5ML
5 SYRUP ORAL
COMMUNITY
End: 2023-12-21

## 2023-07-14 RX ORDER — IBUPROFEN 200 MG
1 TABLET ORAL
COMMUNITY
Start: 2023-05-18

## 2023-07-14 RX ORDER — NICOTINE 7MG/24HR
PATCH, TRANSDERMAL 24 HOURS TRANSDERMAL
COMMUNITY
Start: 2023-06-22 | End: 2024-01-22

## 2023-07-14 RX ORDER — IBUPROFEN 200 MG
1 TABLET ORAL
COMMUNITY
Start: 2023-05-30 | End: 2024-01-22

## 2023-07-14 RX ORDER — PRAVASTATIN SODIUM 40 MG/1
40 TABLET ORAL
COMMUNITY
Start: 2023-06-22

## 2023-07-14 SDOH — SOCIAL DETERMINANTS OF HEALTH (SDOH): FINANCIAL INSECURITY: Z59.86

## 2023-07-14 NOTE — TELEPHONE ENCOUNTER
Patient was issued a co-pay card for Jardiance due to commercial plan. Eligible commercially insured patients may pay as little as $10 per month with a maximum savings of up to $175 per 30-day supply

## 2023-07-14 NOTE — ASSESSMENT & PLAN NOTE
Diabetes Management Status    Statin: Taking  ACE/ARB: Taking    Screening or Prevention Patient's value Goal Complete/Controlled?   HgA1C Testing and Control   Lab Results   Component Value Date    HGBA1C 6.9 (H) 05/06/2021      Annually/Less than 8% No   Lipid profile : 05/06/2021 Annually No   LDL control Lab Results   Component Value Date    LDLCALC 84 05/06/2021    Annually/Less than 100 mg/dl  No   Nephropathy screening No results found for: LABMICR  Lab Results   Component Value Date    PROTEINUA Negative 01/01/2018    Annually No   Blood pressure BP Readings from Last 1 Encounters:   07/14/23 122/76    Less than 140/90 Yes   Dilated retinal exam Most Recent Eye Exam Date: Not Found Annually Yes   Foot exam   Most Recent Foot Exam Date: Not Found Annually Yes     Lab Results   Component Value Date    HGBA1C 6.9 (H) 05/06/2021    HGBA1C 7.0 (H) 11/05/2020    HGBA1C 6.9 (H) 01/02/2018    LDLCALC 84 05/06/2021     No results found for: GLUTAMICACID, CPEPTIDE   Last 5 Patient Entered Readings                                          Most Recent A1c:     There is no flowsheet data to display.        HEALTH MAINTENANCE: Diabetic health maintenance interventions reviewed and are up to date except for:  There are no preventive care reminders to display for this patient.

## 2023-07-14 NOTE — PROGRESS NOTES
ANNUAL WELLNESS VISIT (PREVENTIVE SERVICES)  7/14/23 11:20 AM CDT    Subjective   CHIEF COMPLAINT: Establish Care    HEALTH MAINTENANCE INTERVENTIONS - UP TO DATE  Health Maintenance Topics with due status: Not Due       Topic Last Completion Date    Colorectal Cancer Screening 03/07/2014    Influenza Vaccine 10/15/2021    Diabetes Urine Screening 07/14/2023    Foot Exam 07/14/2023     HEALTH MAINTENANCE INTERVENTIONS - DUE OR DUE SOON  Health Maintenance Due   Topic Date Due    Eye Exam  Never done    TETANUS VACCINE  Never done    Shingles Vaccine (1 of 2) Never done    LDCT Lung Screen  01/01/2019    Hemoglobin A1c  11/06/2021    COVID-19 Vaccine (4 - Moderna series) 12/30/2021    Pneumococcal Vaccines (Age 0-64) (2 - PCV) 05/06/2022    Lipid Panel  05/06/2022    Mammogram  06/01/2022      She comes in as a new patient to establish care, for an annual wellness visit, and preventive services. She is due for breast cancer screening by mammogram. Given her smoking history of greater than 20 pack years, she is eligible for lung cancer screening. We discussed the risks and benefits, and she agreed to proceed.    Screening for obesity is positive. This is addressed elsewhere.    Screening for tobacco use reveals that she quit smoking on May 15, 2023.    Screening for alcohol misuse is negative.    Screening for depression is negative. Screening for dyslipidemia is positive based on previous labs from outside. This is addressed elsewhere.    She also requests evaluation and management of unrelated problems, addressed separately.      Review of Systems   Respiratory:  Negative for chest tightness and shortness of breath.    Cardiovascular:  Negative for chest pain.   Endocrine: Negative for polydipsia and polyuria.       Objective   Vitals:    07/14/23 1120   BP: 122/76   BP Location: Left arm   Patient Position: Sitting   BP Method: Large (Manual)   Pulse: 95   Resp: 19   Temp: 98 °F (36.7 °C)   SpO2: 96%   Weight: 81.5  "kg (179 lb 10.8 oz)   Height: 5' 3" (1.6 m)   Physical Exam  Vitals reviewed.   Constitutional:       General: She is not in acute distress.     Appearance: Normal appearance. She is not ill-appearing or diaphoretic.   Cardiovascular:      Rate and Rhythm: Normal rate and regular rhythm.   Pulmonary:      Effort: Pulmonary effort is normal.      Breath sounds: Normal breath sounds.   Skin:     General: Skin is warm and dry.   Neurological:      General: No focal deficit present.      Mental Status: She is alert and oriented to person, place, and time. Mental status is at baseline.   Psychiatric:         Mood and Affect: Mood normal.         Behavior: Behavior normal.         Thought Content: Thought content normal.         Judgment: Judgment normal.        Assessment and Plan   1. Encounter for well adult exam with abnormal findings    2. Breast cancer screening by mammogram  -     Mammo Digital Screening Bilat w/ Scotty; Future; Expected date: 07/14/2023    3. Encounter for screening for lung cancer  -     CT Chest Lung Screening Low Dose; Future; Expected date: 07/14/2023    4. Encounter for lipid screening for cardiovascular disease         Documentation entered by me for this encounter may have been done in part using speech-recognition technology. Although I have made an effort to ensure accuracy, "sound like" errors may exist and should be interpreted in context.    ADDITIONAL E&M SERVICES PROVIDED - UNRELATED TO PREVENTIVE SERVICES  7/14/23 11:20 AM CDT    In addition to and unrelated to the preventive services provided this date, the following condition(s) were also evaluated and managed.    CHIEF COMPLAINT: Dysuria, Diabetes    HPI: She reports a few days' history of mild to moderate dysuria without hematuria, fever, or flank pain. She also reports urinary frequency and urgency consistent with overactive bladder, and she reports mild to moderate stress urinary incontinence, which she says has been present " "essentially after her hysterectomy. She is anxious about bladder problems due to her father's history of bladder cancer. To address her concern, and to discuss treatment options for urinary urgency, frequency, and incontinence, I am referring her to urology for further evaluation and treatment.    She has long-standing type 2 diabetes mellitus, which is presently uncontrolled based on her verbal report of home blood glucose readings, typically in the 200s and not infrequently above 300. She says that she was previously on Jardiance and Ozempic. 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 Harry Malloy, who prescribes her blood pressure and cholesterol medicines. Her hypertension appears controlled. She is on pravastatin for dyslipidemia. After we obtain her lab results, we will discuss other treatment options (atorvastatin, rosuvastatin).    She says that previously she was unable to afford Jardiance, which is why she has been using the supply from her medical office.    She describes a chronic productive cough on a majority of days for greater than the last two years, consistent with chronic bronchitis, which is consistent with her smoking history. She also reports asthma since childhood, with occasional episodes of wheezing even recently, alleviated by albuterol. On a positive note, she quit smoking on May 15 and is committed to remaining tobacco-free. She is going to get labs ordered and return to see me soon thereafter to review results and discuss treatment options further.     PHYSICAL EXAM  Vitals:    07/14/23 1120   BP: 122/76   BP Location: Left arm   Patient Position: Sitting   BP Method: Large (Manual)   Pulse: 95   Resp: 19   Temp: 98 °F (36.7 °C)   SpO2: 96%   Weight: 81.5 kg (179 lb 10.8 oz)   Height: 5' 3" (1.6 m)   CONSTITUTIONAL: Vital signs noted. No apparent distress. Does not appear acutely ill or septic. Appears adequately " hydrated.  PULM: Lungs clear. Breathing unlabored.  HEART: Regular.  DERM: Skin warm and moist with normal turgor.  NEURO: There are no gross focal motor deficits.  PSYCHIATRIC: Alert and conversant. Mood grossly neutral. Judgment and insight grossly intact.     DIABETIC FOOT EXAM:  Protective Sensation (w/ 10 gram monofilament):  Right: Intact  Left: Intact    Visual Inspection:  Normal -  Bilateral    Pedal Pulses:   Right: Present  Left: Present    Posterior Tibialis Pulses:   Right:Present  Left: Present       Problem List Items Addressed This Visit       Tobacco dependence due to cigarettes, in early remission (Chronic)    Overview     Quit Date = May 15, 2023         Relevant Orders    CT Chest Lung Screening Low Dose    Type 2 diabetes mellitus with hyperglycemia, without long-term current use of insulin (Chronic)    Overview     Intolerant of metformin due to GI side-effects         Current Assessment & Plan     Diabetes Management Status    Statin: Taking  ACE/ARB: Taking    Screening or Prevention Patient's value Goal Complete/Controlled?   HgA1C Testing and Control   Lab Results   Component Value Date    HGBA1C 6.9 (H) 05/06/2021      Annually/Less than 8% No   Lipid profile : 05/06/2021 Annually No   LDL control Lab Results   Component Value Date    LDLCALC 84 05/06/2021    Annually/Less than 100 mg/dl  No   Nephropathy screening No results found for: LABMICR  Lab Results   Component Value Date    PROTEINUA Negative 01/01/2018    Annually No   Blood pressure BP Readings from Last 1 Encounters:   07/14/23 122/76    Less than 140/90 Yes   Dilated retinal exam Most Recent Eye Exam Date: Not Found Annually Yes   Foot exam   Most Recent Foot Exam Date: Not Found Annually Yes     Lab Results   Component Value Date    HGBA1C 6.9 (H) 05/06/2021    HGBA1C 7.0 (H) 11/05/2020    HGBA1C 6.9 (H) 01/02/2018    LDLCALC 84 05/06/2021   No results found for: GLUTAMICACID, CPEPTIDE   Last 5 Patient Entered Readings                                           Most Recent A1c:     There is no flowsheet data to display.        HEALTH MAINTENANCE: Diabetic health maintenance interventions reviewed and are up to date except for:  There are no preventive care reminders to display for this patient.          Relevant Medications    empagliflozin (JARDIANCE) 25 mg tablet    Other Relevant Orders    POCT Glucose, Hand-Held Device (Completed)    Hemoglobin A1C    Comprehensive Metabolic Panel    Lipid Panel    Microalbumin/Creatinine Ratio, Urine (Completed)    Ambulatory referral/consult to Diabetes Education    Ambulatory referral/consult to Pharmacy Assistance    Hidradenitis suppurativa    Hypertension complicating diabetes    Relevant Orders    Comprehensive Metabolic Panel    Lipid Panel    Dyslipidemia complicating type 2 diabetes mellitus    Relevant Medications    pravastatin (PRAVACHOL) 40 MG tablet    Other Relevant Orders    Comprehensive Metabolic Panel    Lipid Panel    Obesity complicating type 2 diabetes    Relevant Orders    Ambulatory referral/consult to Diabetes Education    Patient cannot afford medications    Relevant Orders    Ambulatory referral/consult to Pharmacy Assistance    Simple chronic bronchitis    Relevant Medications    albuterol (PROVENTIL HFA) 90 mcg/actuation inhaler    inhalation spacing device    Asthma-COPD overlap syndrome    Relevant Medications    albuterol (PROVENTIL HFA) 90 mcg/actuation inhaler    inhalation spacing device    Family history of bladder cancer    Relevant Orders    Ambulatory referral/consult to Urology    Stress incontinence of urine    Relevant Orders    Urinalysis, Reflex to Urine Culture Urine, Clean Catch (Completed)    Ambulatory referral/consult to Urology    Dysuria    Relevant Orders    Urinalysis, Reflex to Urine Culture Urine, Clean Catch (Completed)    Overactive bladder    Relevant Orders    Urinalysis, Reflex to Urine Culture Urine, Clean Catch (Completed)    Ambulatory  "referral/consult to Urology      Diabetic eye exam        Relevant Orders    Ambulatory referral/consult to Ophthalmology   Unless noted herein, any chronic conditions are represented as and appear compensated/controlled and stable, and no other significant complaints or concerns were reported.    Follow up in about 2 weeks (around 7/28/2023) for review test results, discuss treatment plan, re-evaluation.  Documentation entered by me for this encounter may have been done in part using speech-recognition technology. Although I have made an effort to ensure accuracy, "sound like" errors may exist and should be interpreted in context.    "

## 2023-07-21 ENCOUNTER — HOSPITAL ENCOUNTER (OUTPATIENT)
Dept: RADIOLOGY | Facility: HOSPITAL | Age: 63
Discharge: HOME | End: 2023-07-21
Attending: FAMILY MEDICINE
Payer: COMMERCIAL

## 2023-07-21 VITALS — HEIGHT: 63 IN | WEIGHT: 179.69 LBS | BODY MASS INDEX: 31.84 KG/M2

## 2023-07-21 DIAGNOSIS — Z12.31 BREAST CANCER SCREENING BY MAMMOGRAM: ICD-10-CM

## 2023-07-21 PROCEDURE — 77067 SCR MAMMO BI INCL CAD: CPT | Mod: TC

## 2023-07-21 PROCEDURE — 77063 BREAST TOMOSYNTHESIS BI: CPT | Mod: 26,,, | Performed by: RADIOLOGY

## 2023-07-21 PROCEDURE — 77063 MAMMO DIGITAL SCREENING BILAT WITH TOMO: ICD-10-PCS | Mod: 26,,, | Performed by: RADIOLOGY

## 2023-07-21 PROCEDURE — 77067 MAMMO DIGITAL SCREENING BILAT WITH TOMO: ICD-10-PCS | Mod: 26,,, | Performed by: RADIOLOGY

## 2023-07-21 PROCEDURE — 77067 SCR MAMMO BI INCL CAD: CPT | Mod: 26,,, | Performed by: RADIOLOGY

## 2023-07-21 NOTE — PROGRESS NOTES
Results to be addressed at upcoming appointment(s) already scheduled.  Future Appointments  7/21/2023  7:45 AM    LABORATORY, [facility]            [facility]  7/21/2023  8:30 AM    [facility] [id number]            [facility] MAMMO          [facility]  7/27/2023  12:00 PM   Nikole Knutson RN       [facility] DIBEDU         [facility]  7/28/2023  7:30 AM    DOREEN Womack MD        [facility]  7/28/2023  12:15 PM   Thierry Strickland MD  [facility] UROLOGY        [facility]  8/4/2023   7:20 AM    NERY Mace MD       [facility]

## 2023-07-28 ENCOUNTER — OFFICE VISIT (OUTPATIENT)
Dept: OPHTHALMOLOGY | Facility: CLINIC | Age: 63
End: 2023-07-28
Payer: COMMERCIAL

## 2023-07-28 ENCOUNTER — OFFICE VISIT (OUTPATIENT)
Dept: UROLOGY | Facility: CLINIC | Age: 63
End: 2023-07-28
Payer: COMMERCIAL

## 2023-07-28 VITALS
BODY MASS INDEX: 31.87 KG/M2 | DIASTOLIC BLOOD PRESSURE: 78 MMHG | SYSTOLIC BLOOD PRESSURE: 119 MMHG | HEART RATE: 82 BPM | WEIGHT: 179.88 LBS

## 2023-07-28 DIAGNOSIS — Z01.00 DIABETIC EYE EXAM: ICD-10-CM

## 2023-07-28 DIAGNOSIS — J44.89 ASTHMA-COPD OVERLAP SYNDROME: ICD-10-CM

## 2023-07-28 DIAGNOSIS — J41.0 SIMPLE CHRONIC BRONCHITIS: ICD-10-CM

## 2023-07-28 DIAGNOSIS — N39.3 STRESS INCONTINENCE OF URINE: ICD-10-CM

## 2023-07-28 DIAGNOSIS — E11.9 DIABETIC EYE EXAM: ICD-10-CM

## 2023-07-28 DIAGNOSIS — N32.81 OVERACTIVE BLADDER: Primary | ICD-10-CM

## 2023-07-28 DIAGNOSIS — H04.129 DRYNESS, EYE: Primary | ICD-10-CM

## 2023-07-28 DIAGNOSIS — Z80.52 FAMILY HISTORY OF BLADDER CANCER: ICD-10-CM

## 2023-07-28 LAB
BILIRUB SERPL-MCNC: ABNORMAL MG/DL
BLOOD URINE, POC: ABNORMAL
CLARITY, POC UA: CLEAR
COLOR, POC UA: YELLOW
GLUCOSE UR QL STRIP: 1000
KETONES UR QL STRIP: ABNORMAL
LEUKOCYTE ESTERASE URINE, POC: ABNORMAL
NITRITE, POC UA: ABNORMAL
PH, POC UA: 5.5
PROTEIN, POC: ABNORMAL
SPECIFIC GRAVITY, POC UA: 1.02
UROBILINOGEN, POC UA: 0.2

## 2023-07-28 PROCEDURE — 99203 PR OFFICE/OUTPT VISIT, NEW, LEVL III, 30-44 MIN: ICD-10-PCS | Mod: S$GLB,,, | Performed by: OPHTHALMOLOGY

## 2023-07-28 PROCEDURE — 1159F PR MEDICATION LIST DOCUMENTED IN MEDICAL RECORD: ICD-10-PCS | Mod: CPTII,S$GLB,, | Performed by: OPHTHALMOLOGY

## 2023-07-28 PROCEDURE — 99999 PR PBB SHADOW E&M-EST. PATIENT-LVL III: ICD-10-PCS | Mod: PBBFAC,,, | Performed by: OPHTHALMOLOGY

## 2023-07-28 PROCEDURE — 1160F PR REVIEW ALL MEDS BY PRESCRIBER/CLIN PHARMACIST DOCUMENTED: ICD-10-PCS | Mod: CPTII,S$GLB,, | Performed by: OPHTHALMOLOGY

## 2023-07-28 PROCEDURE — 99204 PR OFFICE/OUTPT VISIT, NEW, LEVL IV, 45-59 MIN: ICD-10-PCS | Mod: S$GLB,,, | Performed by: UROLOGY

## 2023-07-28 PROCEDURE — 99999 PR PBB SHADOW E&M-EST. PATIENT-LVL III: CPT | Mod: PBBFAC,,, | Performed by: OPHTHALMOLOGY

## 2023-07-28 PROCEDURE — 99203 OFFICE O/P NEW LOW 30 MIN: CPT | Mod: S$GLB,,, | Performed by: OPHTHALMOLOGY

## 2023-07-28 PROCEDURE — 3052F HG A1C>EQUAL 8.0%<EQUAL 9.0%: CPT | Mod: CPTII,S$GLB,, | Performed by: OPHTHALMOLOGY

## 2023-07-28 PROCEDURE — 4010F PR ACE/ARB THEARPY RXD/TAKEN: ICD-10-PCS | Mod: CPTII,S$GLB,, | Performed by: OPHTHALMOLOGY

## 2023-07-28 PROCEDURE — 99204 OFFICE O/P NEW MOD 45 MIN: CPT | Mod: S$GLB,,, | Performed by: UROLOGY

## 2023-07-28 PROCEDURE — 3052F PR MOST RECENT HEMOGLOBIN A1C LEVEL 8.0 - < 9.0%: ICD-10-PCS | Mod: CPTII,S$GLB,, | Performed by: OPHTHALMOLOGY

## 2023-07-28 PROCEDURE — 3061F NEG MICROALBUMINURIA REV: CPT | Mod: CPTII,S$GLB,, | Performed by: OPHTHALMOLOGY

## 2023-07-28 PROCEDURE — 3066F NEPHROPATHY DOC TX: CPT | Mod: CPTII,S$GLB,, | Performed by: OPHTHALMOLOGY

## 2023-07-28 PROCEDURE — 2023F DILAT RTA XM W/O RTNOPTHY: CPT | Mod: CPTII,S$GLB,, | Performed by: OPHTHALMOLOGY

## 2023-07-28 PROCEDURE — 1159F MED LIST DOCD IN RCRD: CPT | Mod: CPTII,S$GLB,, | Performed by: OPHTHALMOLOGY

## 2023-07-28 PROCEDURE — 3066F PR DOCUMENTATION OF TREATMENT FOR NEPHROPATHY: ICD-10-PCS | Mod: CPTII,S$GLB,, | Performed by: OPHTHALMOLOGY

## 2023-07-28 PROCEDURE — 81002 POCT URINE DIPSTICK WITHOUT MICROSCOPE: ICD-10-PCS | Mod: S$GLB,,, | Performed by: UROLOGY

## 2023-07-28 PROCEDURE — 2023F PR DILATED RETINAL EXAM W/O EVID OF RETINOPATHY: ICD-10-PCS | Mod: CPTII,S$GLB,, | Performed by: OPHTHALMOLOGY

## 2023-07-28 PROCEDURE — 99999 PR PBB SHADOW E&M-EST. PATIENT-LVL IV: CPT | Mod: PBBFAC,,, | Performed by: UROLOGY

## 2023-07-28 PROCEDURE — 1160F RVW MEDS BY RX/DR IN RCRD: CPT | Mod: CPTII,S$GLB,, | Performed by: OPHTHALMOLOGY

## 2023-07-28 PROCEDURE — 4010F ACE/ARB THERAPY RXD/TAKEN: CPT | Mod: CPTII,S$GLB,, | Performed by: OPHTHALMOLOGY

## 2023-07-28 PROCEDURE — 99999 PR PBB SHADOW E&M-EST. PATIENT-LVL IV: ICD-10-PCS | Mod: PBBFAC,,, | Performed by: UROLOGY

## 2023-07-28 PROCEDURE — 81002 URINALYSIS NONAUTO W/O SCOPE: CPT | Mod: S$GLB,,, | Performed by: UROLOGY

## 2023-07-28 PROCEDURE — 3061F PR NEG MICROALBUMINURIA RESULT DOCUMENTED/REVIEW: ICD-10-PCS | Mod: CPTII,S$GLB,, | Performed by: OPHTHALMOLOGY

## 2023-07-28 RX ORDER — SOLIFENACIN SUCCINATE 5 MG/1
5 TABLET, FILM COATED ORAL DAILY
Qty: 30 TABLET | Refills: 11 | Status: SHIPPED | OUTPATIENT
Start: 2023-07-28 | End: 2024-07-27

## 2023-07-28 NOTE — PROGRESS NOTES
===============================  Date today is 7/28/2023  Mallika Corbin is a 62 y.o. female  Last visit [facility]: :Visit date not found   Last visit eye dept. Visit date not found    Uncorrected distance visual acuity was 20/30 in the right eye and 20/20 in the left eye.  Tonometry       Tonometry (Applanation, 7:46 AM)         Right Left    Pressure 14 12                  Not recorded       Manifest Refraction       Manifest Refraction         Sphere    Right [place]    Left [place]                  Not recorded       Chief Complaint   Patient presents with    Diabetic Eye Exam     Pt here for dm eye exam / complaining of blurry va after reading for a while     HPI     Diabetic Eye Exam     Additional comments: Pt here for dm eye exam / complaining of blurry va   after reading for a while           Comments    DM SINCE 2019  NO DBR          Last edited by EZRA Montes on 7/28/2023  7:44 AM.      Problem List Items Addressed This Visit    None  Visit Diagnoses       Dryness, eye    -  Primary    Diabetic eye exam              Instructed to call 24/7 for any worsening of vision, visual distortion or pain.  Check OU independently daily.    Gave my office and personal cell phone number.  ________________  7/28/2023 today  Mallika Corbin      Complaining of double vision at near  After Reading, everything gets blurry   Oct nl   Orth ortho   Lens cl v cl no dr   Dm 6 years  Dry eye symptoms- recommend tears 4-6x daily  MR=plano  Recommend +1.00 for computer work, +2.50 for reading  Will dispense office glasses, no distance prescription needed    RTC 1 year with Optometry  Instructed to call 24/7 for any worsening of vision or symptoms. Check OU daily.   Gave my office and cell phone number.    =============================

## 2023-07-28 NOTE — TELEPHONE ENCOUNTER
No care due was identified.  [facility] Embedded Care Due Messages. Reference number: [id number].   7/28/2023 12:30:02 PM CDT

## 2023-07-28 NOTE — TELEPHONE ENCOUNTER
----- Message from Francy Begum sent at 7/28/2023 11:23 AM CDT -----  Contact: deana/[employer]   Deana with  [employer]  is calling to a speak with a nurse regarding medication . Reports needing to know if patient can switch from albuterol (PROVENTIL HFA) 90 mcg/actuation inhaler to different one due to cost . Please send to .    [address] [address]  Phone: [phone] Fax: [phone]      Please [employer] at [phone]  Or fax to [phone]

## 2023-07-28 NOTE — PROGRESS NOTES
Chief Complaint:  OAB, ESTELLE, family history of bladder cancer    HPI:   Mallika Corbin is a 62 y.o. female that presents today as a referral from Dr. Mace for OAB, ESTELLE, and family history of bladder cancer.  Patient relates a history of OAB several years.  She notes pressure and discomfort for the last two weeks.  She notes urgency but denies any urge incontinence, admitting that it gets close sometimes.  She does note stress incontinence.  Does not typically wear pads, stress incontinence has been worse when she coughs and since she is stopped smoking this has greatly improved.  She drinks one 20 oz coffee in the morning, two 16 oz root beers during the day, and 64 oz of water.  She takes Jardiance and notes that she urinates usually every hour.  She denies any UTIs or gross hematuria.  She denies pelvic organ prolapse or vaginal bulge.  She does note a family history of bladder cancer in her father who passed away from bladder cancer.  She had a UA micro which had 0 red blood cells per high-power field, and she denies any gross hematuria.    PMH:  Past Medical History:   Diagnosis Date    Asthma     Hiatal hernia     Hidradenitis suppurativa     pt is unsure of date or year    High cholesterol     Mitral valve prolapse     Reflux        PSH:  Past Surgical History:   Procedure Laterality Date    APPENDECTOMY       SECTION      pyloric stenosis      infant    TOTAL ABDOMINAL HYSTERECTOMY      ovaries in place still.       Family History:  Family History   Problem Relation Age of Onset    Vision loss Mother     Diabetes Mother     Hypertension Mother     Coronary artery disease Mother     Kidney disease Father     Cancer Father     Hypertension Father     Diabetes Father     Vision loss Sister     Kidney disease Sister     Hypertension Sister     Heart disease Sister     Diabetes Sister     Cancer Maternal Grandmother     Breast cancer Maternal Grandmother        Social History:  Social History      Tobacco Use    Smoking status: Former     Packs/day: 0.75     Years: 33.00     Pack years: 24.75     Types: Cigarettes     Quit date: 5/15/2023     Years since quittin.2    Smokeless tobacco: Never   Substance Use Topics    Alcohol use: No    Drug use: No        Review of Systems:  General: No fever, chills  Skin: No rashes  Chest:  Denies cough and sputum production  Heart: Denies chest pain  Resp: Denies dyspnea  Abdomen: Denies diarrhea, abdominal pain, hematemesis, or blood in stool.  Musculoskeletal: No joint stiffness or swelling. Denies back pain.  : see HPI  Neuro: no dizziness or weakness    Allergies:  Bactrim [sulfamethoxazole-trimethoprim] and Hydrocodone-acetaminophen    Medications:    Current Outpatient Medications:     albuterol (PROVENTIL HFA) 90 mcg/actuation inhaler, Inhale 2 puffs into the lungs every 6 (six) hours as needed for Wheezing. (Rescue inhaler), Disp: 18 g, Rfl: 11    empagliflozin (JARDIANCE) 25 mg tablet, Take 1 tablet (25 mg total) by mouth once daily., Disp: 90 tablet, Rfl: 0    famotidine (PEPCID) 20 MG tablet, Take 20 mg by mouth., Disp: , Rfl:     inhalation spacing device, Use as directed when taking inhaled medicine, Disp: 1 each, Rfl: 0    montelukast (SINGULAIR) 10 mg tablet, Take 10 mg by mouth every evening., Disp: , Rfl:     nicotine (NICODERM CQ) 14 mg/24 hr, 1 patch., Disp: , Rfl:     nicotine (NICODERM CQ) 21 mg/24 hr, 1 patch., Disp: , Rfl:     nicotine (NICODERM CQ) 7 mg/24 hr, SMARTSIG:Topical, Disp: , Rfl:     pravastatin (PRAVACHOL) 40 MG tablet, Take 40 mg by mouth., Disp: , Rfl:     promethazine-dextromethorphan (PROMETHAZINE-DM) 6.25-15 mg/5 mL Syrp, Take 5 mLs by mouth., Disp: , Rfl:     valsartan (DIOVAN) 40 MG tablet, Take 40 mg by mouth once daily., Disp: , Rfl:     solifenacin (VESICARE) 5 MG tablet, Take 1 tablet (5 mg total) by mouth once daily., Disp: 30 tablet, Rfl: 11    Physical Exam:  Vitals:    23 1156   BP: 119/78   Pulse: 82      Body mass index is 31.87 kg/m².  General: awake, alert, cooperative  Head: NC/AT  Ears: external ears normal  Eyes: sclera normal  Lungs: normal inspiration, NAD  Heart: well-perfused  Skin: The skin is warm and dry  Ext: No c/c/e.  Neuro: grossly intact, AOx3    RADIOLOGY:  No recent relevant imaging available for review.    LABS:  I personally reviewed the following lab values:  Lab Results   Component Value Date    WBC 6.7 05/06/2021    HGB 16.8 (H) 05/06/2021    HCT 51.2 (H) 05/06/2021     05/06/2021     07/21/2023    K 4.2 07/21/2023     07/21/2023    CREATININE 0.8 07/21/2023    BUN 13 07/21/2023    CO2 28 07/21/2023    TSH 0.811 05/06/2021    HGBA1C 8.4 (H) 07/21/2023    CHOL 159 07/21/2023    TRIG 132 07/21/2023    HDL 45 07/21/2023    ALT 29 07/21/2023    AST 20 07/21/2023     Assessment/Plan:   Mallika Corbin is a 62 y.o. female with:    OAB - start VESIcare, side effects reviewed, f/u 6 weeks for symptom check    Stress incontinence - reviewed options including sling placement versus pelvic floor physical therapy, patient will consider    Family history of bladder cancer - patient denies gross hematuria and has no documented microscopic hematuria, we will defer cysto for now     Thank you for allowing me the opportunity to participate in this patient's care.     Thierry Strickland MD  Urology

## 2023-07-29 RX ORDER — ALBUTEROL SULFATE 90 UG/1
2 AEROSOL, METERED RESPIRATORY (INHALATION) EVERY 6 HOURS PRN
Qty: 18 G | Refills: 11 | Status: SHIPPED | OUTPATIENT
Start: 2023-07-29 | End: 2024-01-22

## 2023-07-29 NOTE — TELEPHONE ENCOUNTER
Refill Routing Note   Medication(s) are not appropriate for processing by [facility] for the following reason(s):      New or recently adjusted medication    [facility] action(s):  Defer Care Due:  None identified            Appointments  past 12m or future 3m with PCP    Date Provider   Last Visit   7/14/2023 NERY Mace MD   Next Visit   8/4/2023 NERY Mace MD   ED visits in past 90 days: 0        Note composed:9:12 AM 07/29/2023

## 2023-08-04 ENCOUNTER — TELEPHONE (OUTPATIENT)
Dept: PHARMACY | Facility: CLINIC | Age: 63
End: 2023-08-04
Payer: COMMERCIAL

## 2023-08-04 ENCOUNTER — OFFICE VISIT (OUTPATIENT)
Dept: INTERNAL MEDICINE | Facility: CLINIC | Age: 63
End: 2023-08-04
Payer: COMMERCIAL

## 2023-08-04 ENCOUNTER — PATIENT MESSAGE (OUTPATIENT)
Dept: INTERNAL MEDICINE | Facility: CLINIC | Age: 63
End: 2023-08-04

## 2023-08-04 DIAGNOSIS — Z59.86 PATIENT CANNOT AFFORD MEDICATIONS: ICD-10-CM

## 2023-08-04 DIAGNOSIS — E11.65 TYPE 2 DIABETES MELLITUS WITH HYPERGLYCEMIA, WITHOUT LONG-TERM CURRENT USE OF INSULIN: Primary | Chronic | ICD-10-CM

## 2023-08-04 PROCEDURE — 3066F NEPHROPATHY DOC TX: CPT | Mod: CPTII,95,, | Performed by: FAMILY MEDICINE

## 2023-08-04 PROCEDURE — 3052F HG A1C>EQUAL 8.0%<EQUAL 9.0%: CPT | Mod: CPTII,95,, | Performed by: FAMILY MEDICINE

## 2023-08-04 PROCEDURE — 4010F ACE/ARB THERAPY RXD/TAKEN: CPT | Mod: CPTII,95,, | Performed by: FAMILY MEDICINE

## 2023-08-04 PROCEDURE — 3061F PR NEG MICROALBUMINURIA RESULT DOCUMENTED/REVIEW: ICD-10-PCS | Mod: CPTII,95,, | Performed by: FAMILY MEDICINE

## 2023-08-04 PROCEDURE — 99214 PR OFFICE/OUTPT VISIT, EST, LEVL IV, 30-39 MIN: ICD-10-PCS | Mod: 95,,, | Performed by: FAMILY MEDICINE

## 2023-08-04 PROCEDURE — 1159F MED LIST DOCD IN RCRD: CPT | Mod: CPTII,95,, | Performed by: FAMILY MEDICINE

## 2023-08-04 PROCEDURE — 1160F RVW MEDS BY RX/DR IN RCRD: CPT | Mod: CPTII,95,, | Performed by: FAMILY MEDICINE

## 2023-08-04 PROCEDURE — 1160F PR REVIEW ALL MEDS BY PRESCRIBER/CLIN PHARMACIST DOCUMENTED: ICD-10-PCS | Mod: CPTII,95,, | Performed by: FAMILY MEDICINE

## 2023-08-04 PROCEDURE — 3052F PR MOST RECENT HEMOGLOBIN A1C LEVEL 8.0 - < 9.0%: ICD-10-PCS | Mod: CPTII,95,, | Performed by: FAMILY MEDICINE

## 2023-08-04 PROCEDURE — 1159F PR MEDICATION LIST DOCUMENTED IN MEDICAL RECORD: ICD-10-PCS | Mod: CPTII,95,, | Performed by: FAMILY MEDICINE

## 2023-08-04 PROCEDURE — 99214 OFFICE O/P EST MOD 30 MIN: CPT | Mod: 95,,, | Performed by: FAMILY MEDICINE

## 2023-08-04 PROCEDURE — 4010F PR ACE/ARB THEARPY RXD/TAKEN: ICD-10-PCS | Mod: CPTII,95,, | Performed by: FAMILY MEDICINE

## 2023-08-04 PROCEDURE — 3061F NEG MICROALBUMINURIA REV: CPT | Mod: CPTII,95,, | Performed by: FAMILY MEDICINE

## 2023-08-04 PROCEDURE — 3066F PR DOCUMENTATION OF TREATMENT FOR NEPHROPATHY: ICD-10-PCS | Mod: CPTII,95,, | Performed by: FAMILY MEDICINE

## 2023-08-04 RX ORDER — SEMAGLUTIDE 0.68 MG/ML
INJECTION, SOLUTION SUBCUTANEOUS
Qty: 9 ML | Refills: 0 | Status: SHIPPED | OUTPATIENT
Start: 2023-08-04 | End: 2023-11-02

## 2023-08-04 SDOH — SOCIAL DETERMINANTS OF HEALTH (SDOH): FINANCIAL INSECURITY: Z59.86

## 2023-08-04 NOTE — ASSESSMENT & PLAN NOTE
"This is a chronic problem, with exacerbation or progression.  She reports she previously did very well on Ozempic, but she couldn't afford it when she no longer could get samples. No history of allergy or hypersensitivity to GLP-1 Agonists. No history or family history of thyroid cancer or multiple endocrine neoplasia syndrome.  Diabetes Management Status    Statin: Taking  ACE/ARB: Taking    Screening or Prevention Patient's value Goal Complete/Controlled?   HgA1C Testing and Control   Lab Results   Component Value Date    HGBA1C 8.4 (H) 07/21/2023      Annually/Less than 8% No   Lipid profile : 07/21/2023 Annually Yes   LDL control Lab Results   Component Value Date    LDLCALC 87.6 07/21/2023    Annually/Less than 100 mg/dl  Yes   Nephropathy screening Lab Results   Component Value Date    LABMICR <5.0 07/14/2023     Lab Results   Component Value Date    PROTEINUA Negative 07/14/2023    Annually Yes   Blood pressure BP Readings from Last 1 Encounters:   07/28/23 119/78    Less than 140/90 Yes   Dilated retinal exam : 07/28/2023 Annually Yes   Foot exam   : 07/14/2023 Annually Yes     Lab Results   Component Value Date    HGBA1C 8.4 (H) 07/21/2023    HGBA1C 6.9 (H) 05/06/2021    HGBA1C 7.0 (H) 11/05/2020    EGFRNORACEVR >60.0 07/21/2023    MICALBCREAT Unable to calculate 07/14/2023    LDLCALC 87.6 07/21/2023     No results found for: "GLUTAMICACID", "CPEPTIDE"   Last 5 Patient Entered Readings                                          Most Recent A1c:     There is no flowsheet data to display.        HEALTH MAINTENANCE: Diabetic health maintenance interventions reviewed and are up to date except for:  There are no preventive care reminders to display for this patient.   "

## 2023-08-04 NOTE — PROGRESS NOTES
TELEMEDICINE VIRTUAL VISIT  8/4/23  7:20 AM CDT    Visit Type: Audiovisual    Patient's Location: Mallika represents that they are located within the state [facility].    Subjective   CHIEF COMPLAINT: Follow-up diabetes    Diabetes  She has type 2 diabetes mellitus. No MedicAlert identification noted. The initial diagnosis of diabetes was made 5 years ago. Pertinent negatives for hypoglycemia include no confusion, dizziness, headaches, hunger, mood changes, nervousness/anxiousness, pallor, seizures, sleepiness, speech difficulty or sweats. Associated symptoms include blurred vision, foot paresthesias, polydipsia and polyuria. Pertinent negatives for diabetes include no chest pain, no fatigue, no foot ulcerations, no polyphagia, no visual change, no weakness and no weight loss. Pertinent negatives for hypoglycemia complications include no blackouts, no hospitalization, no nocturnal hypoglycemia, no required assistance and no required glucagon injection. Symptoms are worsening. Diabetic complications include heart disease and PVD. Pertinent negatives for diabetic complications include no autonomic neuropathy, CVA, nephropathy, peripheral neuropathy or retinopathy. Risk factors for coronary artery disease include dyslipidemia and hypertension. Current diabetic treatment includes oral agent (monotherapy). She is compliant with treatment all of the time. Her weight is increasing steadily. She is following a generally unhealthy diet. When asked about meal planning, she reported none. She has had a previous visit with a dietitian. She never participates in exercise. She monitors blood glucose at home 1-2 x per day. She monitors urine at home <1 x per month. Blood glucose monitoring compliance is inadequate. Her home blood glucose trend is increasing steadily. She does not see a podiatrist.Eye exam is current.       Review of Systems   Constitutional:  Negative for fatigue and weight loss.   Eyes:  Positive for blurred  vision.   Respiratory:  Negative for chest tightness and shortness of breath.    Cardiovascular:  Negative for chest pain.   Endocrine: Positive for polydipsia and polyuria. Negative for polyphagia.   Integumentary:  Negative for pallor.   Neurological:  Negative for dizziness, seizures, speech difficulty, weakness and headaches.   Psychiatric/Behavioral:  Negative for confusion. The patient is not nervous/anxious.          Objective   Physical Exam  Constitutional:       General: She is not in acute distress.     Appearance: Normal appearance. She is not ill-appearing.   Pulmonary:      Effort: Pulmonary effort is normal. No respiratory distress.   Skin:     Coloration: Skin is not jaundiced.   Neurological:      Mental Status: She is alert. Mental status is at baseline.   Psychiatric:         Mood and Affect: Mood normal.         Behavior: Behavior normal.         Thought Content: Thought content normal.          Assessment and Plan   1. Type 2 diabetes mellitus with hyperglycemia, without long-term current use of insulin  Overview:  Intolerant of metformin due to GI side-effects    Assessment & Plan:  This is a chronic problem, with exacerbation or progression.  She reports she previously did very well on Ozempic, but she couldn't afford it when she no longer could get samples. No history of allergy or hypersensitivity to GLP-1 Agonists. No history or family history of thyroid cancer or multiple endocrine neoplasia syndrome.  Diabetes Management Status    Statin: Taking  ACE/ARB: Taking    Screening or Prevention Patient's value Goal Complete/Controlled?   HgA1C Testing and Control   Lab Results   Component Value Date    HGBA1C 8.4 (H) 07/21/2023      Annually/Less than 8% No   Lipid profile : 07/21/2023 Annually Yes   LDL control Lab Results   Component Value Date    LDLCALC 87.6 07/21/2023    Annually/Less than 100 mg/dl  Yes   Nephropathy screening Lab Results   Component Value Date    LABMICR <5.0 07/14/2023  "    Lab Results   Component Value Date    PROTEINUA Negative 07/14/2023    Annually Yes   Blood pressure BP Readings from Last 1 Encounters:   07/28/23 119/78    Less than 140/90 Yes   Dilated retinal exam : 07/28/2023 Annually Yes   Foot exam   : 07/14/2023 Annually Yes     Lab Results   Component Value Date    HGBA1C 8.4 (H) 07/21/2023    HGBA1C 6.9 (H) 05/06/2021    HGBA1C 7.0 (H) 11/05/2020    EGFRNORACEVR >60.0 07/21/2023    MICALBCREAT Unable to calculate 07/14/2023    LDLCALC 87.6 07/21/2023     No results found for: "GLUTAMICACID", "CPEPTIDE"   Last 5 Patient Entered Readings                                          Most Recent A1c:     There is no flowsheet data to display.        HEALTH MAINTENANCE: Diabetic health maintenance interventions reviewed and are up to date except for:  There are no preventive care reminders to display for this patient.     Orders:  -     semaglutide (OZEMPIC) 0.25 mg or 0.5 mg (2 mg/3 mL) pen injector; Inject 0.25 mg into the skin every 7 days for 30 days, THEN 0.5 mg every 7 days.  Dispense: 9 mL; Refill: 0  -     Ambulatory referral/consult to Pharmacy Assistance; Future; Expected date: 08/11/2023  -     Hemoglobin A1C; Future; Expected date: 08/04/2023    2. Patient cannot afford medications    Unless noted herein, any chronic conditions are represented as and appear stable, and no other significant complaints or concerns were reported.    Follow up in about 8 weeks (around 10/2/2023) for virtual visit, review test results, discuss treatment plan, re-evaluation.   Future Appointments   Date Time Provider Department Center   8/11/2023  7:30 AM Nikole Knutson RN [facility] DIBEDU  [place]   9/22/2023  9:30 AM Thierry Strickland MD [facility] UROLOGY [facility]          Documentation entered by me for this encounter may have been done in part using speech-recognition technology. Although I have made an effort to ensure accuracy, "sound like" errors may exist and should be " "interpreted in context.   TOTAL TIME evaluating and managing this patient for this encounter was greater than or equal to 21 minutes. This time was exclusive of any separately billable procedures for this patient and exclusive of time spent treating any other patient.    Documentation entered by me for this encounter may have been done in part using speech-recognition technology. Although I have made an effort to ensure accuracy, "sound like" errors may exist and should be interpreted in context.    Each patient to whom medical services are provided by telemedicine is: (1) informed of the relationship between the physician and patient and the respective role of any other health care provider with respect to management of the patient; and (2) notified that he or she may decline to receive medical services by telemedicine and may withdraw from such care at any time.  "

## 2023-08-11 ENCOUNTER — CLINICAL SUPPORT (OUTPATIENT)
Dept: DIABETES | Facility: CLINIC | Age: 63
End: 2023-08-11
Payer: COMMERCIAL

## 2023-08-11 VITALS — BODY MASS INDEX: 32.37 KG/M2 | WEIGHT: 182.75 LBS

## 2023-08-11 DIAGNOSIS — E66.9 TYPE 2 DIABETES MELLITUS WITH OBESITY: ICD-10-CM

## 2023-08-11 DIAGNOSIS — E11.65 TYPE 2 DIABETES MELLITUS WITH HYPERGLYCEMIA, WITHOUT LONG-TERM CURRENT USE OF INSULIN: Chronic | ICD-10-CM

## 2023-08-11 DIAGNOSIS — E11.69 TYPE 2 DIABETES MELLITUS WITH OBESITY: ICD-10-CM

## 2023-08-11 PROCEDURE — 99999 PR PBB SHADOW E&M-EST. PATIENT-LVL III: CPT | Mod: PBBFAC,,,

## 2023-08-11 PROCEDURE — G0108 PR DIAB MANAGE TRN  PER INDIV: ICD-10-PCS | Mod: S$GLB,,,

## 2023-08-11 PROCEDURE — 99999 PR PBB SHADOW E&M-EST. PATIENT-LVL III: ICD-10-PCS | Mod: PBBFAC,,,

## 2023-08-11 PROCEDURE — G0108 DIAB MANAGE TRN  PER INDIV: HCPCS | Mod: S$GLB,,,

## 2023-08-11 NOTE — PROGRESS NOTES
"Diabetes Care Specialist Progress Note  Author: Nikole Knutson RN  Date: 8/11/2023    Program Intake  Reason for Diabetes Program Visit:: Initial Diabetes Assessment  Current diabetes risk level:: moderate  In the last 12 months, have you:: none  Permission to speak with others about care:: no    Lab Results   Component Value Date    HGBA1C 8.4 (H) 07/21/2023     DM INTRO  Weight: 82.9 kg (182 lb 12.2 oz)       Body mass index is 32.37 kg/m².    CURRENT MEDS  Ozempic 0.25 mg weekly (Has not started yet)  Jardiance 25 mg daily (Sometimes takes extra does if BG is >300)    Clinical    Patient Health Rating  Compared to other people your age, how would you rate your health?: Fair    Problem Review  Reviewed Problem List with Patient: yes  Active comorbidities affecting diabetes self-care.: yes  Comorbidities: Cardiovascular Disease, Hypertension, Gastrointestinal Disorder  Reviewed health maintenance: yes    Clinical Assessment  Current Diabetes Treatment: Oral Medication  Have you ever experienced hypoglycemia (low blood sugar)?: no (Feels shaky at 80 or 90)  Have you ever experienced hyperglycemia (high blood sugar)?: yes  In the last month, how often have you experienced high blood sugar?: more than once a week  Are you able to tell when your blood sugar is high?: Yes  What are your symptoms?: other (see comments) ("foggy")  Have you ever been hospitalized because your blood sugar was high?: no    Medication Information  How do you obtain your medications?: Patient drives  How many days a week do you miss your medications?: Never  Do you sometimes have difficulty refilling your medications?: No  Medication adherence impacting ability to self-manage diabetes?: Yes (Affordability concerns)    Labs  Do you have regular lab work to monitor your medications?: Yes  Type of Regular Lab Work: A1c, Microalbumin, CBC, BMP, Cholesterol  Where do you get your labs drawn?: [facility]  Lab Compliance Barriers: No    Nutritional " Status  Diet: Regular (Usually drinks a 16 oz RB throughout the day)  Meal Plan 24 Hour Recall: Breakfast, Lunch, Dinner, Snack  Meal Plan 24 Hour Recall - Breakfast: Usually: Breakfast burrito: Tortilla, eggs, cheese, & telles bits; Coffee with sugar and creamer  Meal Plan 24 Hour Recall - Lunch: Cheeseburger with lettuce, tomato, and pickle & french fries; Root Beer (Usually skips)  Meal Plan 24 Hour Recall - Dinner: Spaghetti and meatballs; Root Beer  Meal Plan 24 Hour Recall - Snack: None.  Change in appetite?: Yes (Increased due to stopping smoking in May 2023)  Dentation:: Intact (Poor dentation)  Recent Changes in Weight: Weight Gain (Increased by 12 lbs since stopping smoking)  Current nutritional status an area of need that is impacting patient's ability to self-manage diabetes?: Yes (Affordability concerns)    Additional Social History    Support  Does anyone support you with your diabetes care?: yes  Who supports you?: self  Who takes you to your medical appointments?: self  Does the current support meet the patient's needs?: Yes  Is Support an area impacting ability to self-manage diabetes?: No    Access to Mass Media & Technology  Does the patient have access to any of the following devices or technologies?: Smart phone  Media or technology needs impacting ability to self-manage diabetes?: No    Cognitive/Behavioral Health  Alert and Oriented: Yes  Difficulty Thinking: No  Requires Prompting: No  Requires assistance for routine expression?: No  Cognitive or behavioral barriers impacting ability to self-manage diabetes?: No    Culture/Anabaptist  Culture or Yarsani beliefs that may impact ability to access healthcare: No    Communication  Language preference: English  Hearing Problems: No  Vision Problems: No  Communication needs impacting ability to self-manage diabetes?: No    Health Literacy  Preferred Learning Method: Face to Face, Reading Materials  How often do you need to have someone help you read  instructions, pamphlets, or written material from your doctor or pharmacy?: Never  Health literacy needs impacting ability to self-manage diabetes?: No      Diabetes Self-Management Skills Assessment    Diabetes Disease Process/Treatment Options  Patient/caregiver able to state what happens when someone has diabetes.: no  Patient/caregiver knows what type of diabetes they have.: yes  Diabetes Type : Type II  Patient/caregiver able to identify at least three signs and symptoms of diabetes.: no  Patient able to identify at least three risk factors for diabetes.: yes  Identified risk factors:: age over 40, being overweight, family history, reduced activity  Diabetes Disease Process/Treatment Options: Skills Assessment Completed: Yes  Assessment indicates:: Knowledge deficit  Area of need?: Yes    Nutrition/Healthy Eating  Challenges to healthy eating:: eating out, going to parties, other (see comments) (Affordability)  Method of carbohydrate measurement:: no method, no knowledge of what carbs are  Patient can identify foods that impact blood sugar.: no (see comments)  Nutrition/Healthy Eating Skills Assessment Completed:: Yes  Assessment indicates:: Instruction Needed, Knowledge deficit  Area of need?: Yes    Physical Activity/Exercise  Physical Activity/Exercise Skills Assessment Completed: : No  Deffered due to:: Time  Area of need?:  (Deferred.)    Medications  Patient is able to describe current diabetes management routine.: yes  Diabetes management routine:: oral medications  Patient is able to identify current diabetes medications, dosages, and appropriate timing of medications.: yes  Patient understands the purpose of the medications taken for diabetes.: yes  Patient reports problems or concerns with current medication regimen.: yes  Medication regimen problems/concerns:: does not feel like regimen is working, financial concerns  Medication Skills Assessment Completed:: Yes  Assessment indicates:: Knowledge  deficit  Area of need?: Yes    Home Blood Glucose Monitoring  Patient states that blood sugar is checked at home daily.: yes  Monitoring Method:: home glucometer  How often do you check your blood sugar?: Occasionally  When do you check your blood sugar?: Other (When she feels bad)  When you check what is your typical blood sugar range? : 200s-300s  Blood glucose logs:: no, encouraged to keep logs, encouraged to bring logs to provider visits  Blood glucose logs reviewed today?: no (Reviewed meter)  Home Blood Glucose Monitoring Skills Assessment Completed: : Yes  Assessment indicates:: Knowledge deficit, Instruction Needed  Area of need?: Yes    Acute Complications  Patient is able to identify types of acute complications: Yes  Patient Identified:: Hypoglycemia, Hyperglycemia  Acute Complications Skills Assessment Completed: : Yes  Assessment indicates:: Adequate understanding  Area of need?: No    Chronic Complications  Patient can identify major chronic complications of diabetes.: yes  Stated chronic complications:: kidney disease, neuropathy/nerve damage  Patient can identify ways to prevent or delay diabetes complications.: yes  Stated ways to prevent complications:: maintaining optimal blood glucose control, checking feet daily and having routine comprehensive foot exams, having regular diabetic eye exams, avoiding smoking and other types of tobacco, controlling cholesterol and triglycerides  Patient is aware that having diabetes increases risk of heart disease?: No  Patient is aware that heart disease is the leading cause of death and disability in people with diabetes?: No  Patient able to state risk factors for heart disease?: Yes  Patient stated risk factors for heart disease:: High blood pressure, High cholesterol, Diet, Having diabetes  Patient is taking statin?: Yes  Chronic Complications Skills Assessment Completed: : Yes  Assessment indicates:: Knowledge deficit  Area of need?:  Yes    Psychosocial/Coping  Patient can identify ways of coping with chronic disease.: yes  Patient-stated ways of coping with chronic disease:: other (see comments) (Painting)  Psychosocial/Coping Skills Assessment Completed: : Yes  Assessment indicates:: Adequate understanding  Area of need?: No    Assessment Summary and Plan    Based on today's diabetes care assessment, the following areas of need were identified:      Social 8/11/2023   Support No   Access to Mass Media/Tech No   Cognitive/Behavioral Health No   Culture/Orthodox No   Communication No   Health Literacy No        Clinical 8/11/2023   Medication Adherence Yes   Lab Compliance No   Nutritional Status Yes        Diabetes Self-Management Skills 8/11/2023   Diabetes Disease Process/Treatment Options Yes-Reviewed pathophysiology of type 2 diabetes, complications related to the disease, risk factors, and treatment options.     Nutrition/Healthy Eating Yes-See care plan.     Physical Activity/Exercise Deferred.   Ms. Corbin states she does not exercise because sweating aggravates her HS. Will discuss alternatives at follow-up visit.     Medication Yes-Discussed benefits of Jardiance and Ozempic. She needs better medication management; will establish her with DM ZACKERY who can help. Also needs financial assistance with medications.     Home Blood Glucose Monitoring Yes-See care plan.     Acute Complications No-Able to identify highs and lows.      Chronic Complications Yes-Discussed importance of A1c less than 7 to reduce risk of micro and macro complications, including nephropathy, neuropathy, retinopathy, heart attack and stroke. Reviewed the importance of controlled Blood Pressure and Cholesterol Lab Values in preventing disease.   Health maintenance reviewed.     Psychosocial/Coping No-Ms. Corbin is close with her family. Her and her granddaughter enjoy painting together.             Today's interventions were provided through individual discussion,  "instruction, and written materials were provided.      Patient verbalized understanding of instruction and written materials.  Pt was able to return back demonstration of instructions today. Patient understood key points, needs reinforcement and further instruction.     Diabetes Self-Management Care Plan:    Today's Diabetes Self-Management Care Plan was developed with Mallika's input. Mallika has agreed to work toward the following goal(s) to improve his/her overall diabetes control.      Care Plan: Diabetes Management   Updates made since 7/12/2023 12:00 AM        Problem: Healthy Eating         Goal: Eat 3 meals daily with 30-45g of carbohydrates per meal and 0-15g per snack.    Start Date: 8/11/2023   Expected End Date: 8/11/2024   Priority: High   Barriers: Financial   Note:    Ms. Corbin states due to affordability concerns, she eats out often, avoids cooking, and eats a lot of "junk food". She does not buy a lot of fresh fruit and vegetables because it goes bad before it gets eaten. Reassured Ms. Corbin that we can still work with what she is able to do because it is best to find realistic solutions for her so that the changes made can be sustainable.   Educated Ms. Corbin on carbohydrates and their role in providing the body energy. Educated that carbs are not bad, rather they should be eaten in moderation and paired with a protein and/or healthy fat.   Encouraged her to  look up the nutrition facts for the places she eats most. Together we navigated the [employer] nutrition menu, [employer] nutrition menu, and [employer] nutrition menu. Offered ways to decrease carb intake such as opting for blackened tenders instead of fried or no fries with a burger, etc.   She states she drinks 1 16 oz root beer throughout the day; encouraged her to switch to diet or another SF option, but she said that is not realistic for her right now. She is willing to try Splenda in her coffee instead of sugar.   Educated on the My Plate " method and encouraged her to have some veggies at home to add to the meals she picks up.   Reviewed reading food labels and measuring foods. There is not much cooking that occurs at home, but encouraged to measure foods when she can so she can become familiar with serving sizes and be able to apply those serving sizes to meals eaten outside of the home.   Provided her with the carb list, snacks, and meals in the DM management guide, 25 healthy snack ideas from www.diabetesfoodhub.org, and the [place] meal plan. Suggested using these materials to help with portions and not necessarily things she has to cook.        Task: Reviewed the sources and role of Carbohydrate, Protein, and Fat and how each nutrient impacts blood sugar. Completed 8/11/2023        Task: Explained how to count carbohydrates using the food label and the use of dry measuring cups for accurate carb counting. Completed 8/11/2023        Task: Discussed strategies for choosing healthier menu options when dining out. Completed 8/11/2023        Task: Recommended replacing beverages containing high sugar content with noncaloric/sugar free options and/or water. Completed 8/11/2023        Task: Review the importance of balancing carbohydrates with each meal using portion control techniques to count servings of carbohydrate and label reading to identify serving size and amount of total carbs per serving. Completed 8/11/2023        Task: Provided Sample plate method and reviewed the use of the plate to estimate amounts of carbohydrate per meal. Completed 8/11/2023        Problem: Blood Glucose Self-Monitoring         Goal: Patient agrees to check and record blood sugars 1-2 times per day.    Start Date: 8/11/2023   Expected End Date: 8/11/2024   Priority: Medium   Barriers: Pain Associated with Finger Sticks   Note:    Ms. Corbin currently only checks her blood sugars when she is feeling bad and most of those readings are in the 200-300s.   Educated on the  importance of checking fasting blood sugars and 2 hours after meals. Reviewed goals with her including fastings of  mg/dL and 2 hours after meals with a goal of <160 mg/dL. Provided her with logs and educated on the importance of bringing them to her follow-up with me.   Fixed the date and time on her meter.   She is interested in a Dexcom. She would be a good candidate for a Dexcom Pro; will schedule her with an ZACKERY and make my recommendation.        Task: Reviewed the importance of self-monitoring blood glucose and keeping logs. Completed 8/11/2023        Task: Provided patient with blood glucose logs, reviewed appropriate timing and frequency to SMBG, education on parameters on when to notify provider and advised patient to bring logs to all appts with PCP/Endocrinologist/Diabetes Care Specialist. Completed 8/11/2023          Follow Up Plan     Follow up in about 6 weeks (around 9/22/2023) for review of logs and further nutrition education.    Today's care plan and follow up schedule was discussed with patient.  Mallika verbalized understanding of the care plan, goals, and agrees to follow up plan.        The patient was encouraged to communicate with his/her health care provider/physician and care team regarding his/her condition(s) and treatment.  I provided the patient with my contact information today and encouraged to contact me via phone or [facility] Patient Portal as needed.     Length of Visit   Total Time: 65 Minutes

## 2023-08-11 NOTE — Clinical Note
Good morning Karley, this is a new patient to me and I think she would benefit greatly from seeing you and even placement of a Dexcom Pro. I scheduled her for September, but she will call back if she can come sooner. Thanks!

## 2023-08-16 ENCOUNTER — PATIENT MESSAGE (OUTPATIENT)
Dept: DIABETES | Facility: CLINIC | Age: 63
End: 2023-08-16
Payer: COMMERCIAL

## 2023-08-17 ENCOUNTER — TELEPHONE (OUTPATIENT)
Dept: RADIOLOGY | Facility: HOSPITAL | Age: 63
End: 2023-08-17
Payer: COMMERCIAL

## 2023-08-19 NOTE — PROGRESS NOTES
"Hi, Mallika.    I am proud of you for getting your breast cancer screening done!    The radiologist interpreted your test result findings as INCOMPLETE. This category is also called "BI-RADS 0." This means the radiologist may have seen a possible abnormality, but it was not clear.    This result category may mean that you need more tests, such as another mammogram with the use of spot compression (applying compression to a smaller area when doing the mammogram), magnified views, special mammogram views, or ultrasound. This category may also suggest that the radiologist wants to compare your new mammogram with older ones to see if there have been changes in the area over time.    Someone from [facility] breast imaging center should be contacting you with details. Let me know if you haven't heard from them within the next two weeks.    I'll share your final (complete) breast imaging test results with you once I have received them.    Thanks for letting me care for you, and thanks for trusting [facility] with your healthcare needs.    Sincerely,    JUSTINA Mace MD "

## 2023-10-09 ENCOUNTER — PATIENT MESSAGE (OUTPATIENT)
Dept: ADMINISTRATIVE | Facility: HOSPITAL | Age: 63
End: 2023-10-09
Payer: COMMERCIAL

## 2023-10-09 ENCOUNTER — PATIENT OUTREACH (OUTPATIENT)
Dept: ADMINISTRATIVE | Facility: HOSPITAL | Age: 63
End: 2023-10-09
Payer: COMMERCIAL

## 2023-10-09 NOTE — PROGRESS NOTES
Epic CAMPAIGN: per portal response pt is requesting Ha1c to be scheduled, sent pt a reply to verify when she would like to go? Waiting for pt to reply.

## 2023-11-30 ENCOUNTER — OFFICE VISIT (OUTPATIENT)
Dept: PRIMARY CARE CLINIC | Facility: CLINIC | Age: 63
End: 2023-11-30
Payer: COMMERCIAL

## 2023-11-30 ENCOUNTER — TELEPHONE (OUTPATIENT)
Dept: INTERNAL MEDICINE | Facility: CLINIC | Age: 63
End: 2023-11-30
Payer: COMMERCIAL

## 2023-11-30 ENCOUNTER — LAB VISIT (OUTPATIENT)
Dept: LAB | Facility: HOSPITAL | Age: 63
End: 2023-11-30
Attending: INTERNAL MEDICINE
Payer: COMMERCIAL

## 2023-11-30 VITALS
TEMPERATURE: 98 F | BODY MASS INDEX: 30.46 KG/M2 | DIASTOLIC BLOOD PRESSURE: 77 MMHG | OXYGEN SATURATION: 97 % | WEIGHT: 171.94 LBS | HEIGHT: 63 IN | SYSTOLIC BLOOD PRESSURE: 128 MMHG | HEART RATE: 79 BPM

## 2023-11-30 DIAGNOSIS — E11.9 DIABETES MELLITUS WITH COINCIDENT HYPERTENSION: ICD-10-CM

## 2023-11-30 DIAGNOSIS — Z23 NEED FOR PNEUMOCOCCAL 20-VALENT CONJUGATE VACCINATION: ICD-10-CM

## 2023-11-30 DIAGNOSIS — I10 DIABETES MELLITUS WITH COINCIDENT HYPERTENSION: Primary | ICD-10-CM

## 2023-11-30 DIAGNOSIS — E08.65 DIABETES MELLITUS DUE TO UNDERLYING CONDITION WITH HYPERGLYCEMIA, WITHOUT LONG-TERM CURRENT USE OF INSULIN: ICD-10-CM

## 2023-11-30 DIAGNOSIS — E11.9 DIABETES MELLITUS WITH COINCIDENT HYPERTENSION: Primary | ICD-10-CM

## 2023-11-30 DIAGNOSIS — I10 DIABETES MELLITUS WITH COINCIDENT HYPERTENSION: ICD-10-CM

## 2023-11-30 PROCEDURE — 90471 PNEUMOCOCCAL CONJUGATE VACCINE 20-VALENT: ICD-10-PCS | Mod: S$GLB,,, | Performed by: INTERNAL MEDICINE

## 2023-11-30 PROCEDURE — 1159F MED LIST DOCD IN RCRD: CPT | Mod: CPTII,S$GLB,, | Performed by: INTERNAL MEDICINE

## 2023-11-30 PROCEDURE — 99999 PR PBB SHADOW E&M-EST. PATIENT-LVL IV: CPT | Mod: PBBFAC,,, | Performed by: INTERNAL MEDICINE

## 2023-11-30 PROCEDURE — 4010F PR ACE/ARB THEARPY RXD/TAKEN: ICD-10-PCS | Mod: CPTII,S$GLB,, | Performed by: INTERNAL MEDICINE

## 2023-11-30 PROCEDURE — 99214 OFFICE O/P EST MOD 30 MIN: CPT | Mod: 25,S$GLB,, | Performed by: INTERNAL MEDICINE

## 2023-11-30 PROCEDURE — 3052F HG A1C>EQUAL 8.0%<EQUAL 9.0%: CPT | Mod: CPTII,S$GLB,, | Performed by: INTERNAL MEDICINE

## 2023-11-30 PROCEDURE — 3052F PR MOST RECENT HEMOGLOBIN A1C LEVEL 8.0 - < 9.0%: ICD-10-PCS | Mod: CPTII,S$GLB,, | Performed by: INTERNAL MEDICINE

## 2023-11-30 PROCEDURE — 3074F SYST BP LT 130 MM HG: CPT | Mod: CPTII,S$GLB,, | Performed by: INTERNAL MEDICINE

## 2023-11-30 PROCEDURE — 90677 PNEUMOCOCCAL CONJUGATE VACCINE 20-VALENT: ICD-10-PCS | Mod: S$GLB,,, | Performed by: INTERNAL MEDICINE

## 2023-11-30 PROCEDURE — 1159F PR MEDICATION LIST DOCUMENTED IN MEDICAL RECORD: ICD-10-PCS | Mod: CPTII,S$GLB,, | Performed by: INTERNAL MEDICINE

## 2023-11-30 PROCEDURE — 3008F PR BODY MASS INDEX (BMI) DOCUMENTED: ICD-10-PCS | Mod: CPTII,S$GLB,, | Performed by: INTERNAL MEDICINE

## 2023-11-30 PROCEDURE — 3078F PR MOST RECENT DIASTOLIC BLOOD PRESSURE < 80 MM HG: ICD-10-PCS | Mod: CPTII,S$GLB,, | Performed by: INTERNAL MEDICINE

## 2023-11-30 PROCEDURE — 99214 PR OFFICE/OUTPT VISIT, EST, LEVL IV, 30-39 MIN: ICD-10-PCS | Mod: 25,S$GLB,, | Performed by: INTERNAL MEDICINE

## 2023-11-30 PROCEDURE — 90677 PCV20 VACCINE IM: CPT | Mod: S$GLB,,, | Performed by: INTERNAL MEDICINE

## 2023-11-30 PROCEDURE — 3061F PR NEG MICROALBUMINURIA RESULT DOCUMENTED/REVIEW: ICD-10-PCS | Mod: CPTII,S$GLB,, | Performed by: INTERNAL MEDICINE

## 2023-11-30 PROCEDURE — 3066F PR DOCUMENTATION OF TREATMENT FOR NEPHROPATHY: ICD-10-PCS | Mod: CPTII,S$GLB,, | Performed by: INTERNAL MEDICINE

## 2023-11-30 PROCEDURE — 3061F NEG MICROALBUMINURIA REV: CPT | Mod: CPTII,S$GLB,, | Performed by: INTERNAL MEDICINE

## 2023-11-30 PROCEDURE — 3074F PR MOST RECENT SYSTOLIC BLOOD PRESSURE < 130 MM HG: ICD-10-PCS | Mod: CPTII,S$GLB,, | Performed by: INTERNAL MEDICINE

## 2023-11-30 PROCEDURE — 3078F DIAST BP <80 MM HG: CPT | Mod: CPTII,S$GLB,, | Performed by: INTERNAL MEDICINE

## 2023-11-30 PROCEDURE — 82570 ASSAY OF URINE CREATININE: CPT | Performed by: INTERNAL MEDICINE

## 2023-11-30 PROCEDURE — 99999 PR PBB SHADOW E&M-EST. PATIENT-LVL IV: ICD-10-PCS | Mod: PBBFAC,,, | Performed by: INTERNAL MEDICINE

## 2023-11-30 PROCEDURE — 4010F ACE/ARB THERAPY RXD/TAKEN: CPT | Mod: CPTII,S$GLB,, | Performed by: INTERNAL MEDICINE

## 2023-11-30 PROCEDURE — 90471 IMMUNIZATION ADMIN: CPT | Mod: S$GLB,,, | Performed by: INTERNAL MEDICINE

## 2023-11-30 PROCEDURE — 3066F NEPHROPATHY DOC TX: CPT | Mod: CPTII,S$GLB,, | Performed by: INTERNAL MEDICINE

## 2023-11-30 PROCEDURE — 3008F BODY MASS INDEX DOCD: CPT | Mod: CPTII,S$GLB,, | Performed by: INTERNAL MEDICINE

## 2023-11-30 RX ORDER — SEMAGLUTIDE 0.68 MG/ML
0.5 INJECTION, SOLUTION SUBCUTANEOUS
Qty: 9 ML | Refills: 5 | Status: SHIPPED | OUTPATIENT
Start: 2023-11-30 | End: 2023-11-30

## 2023-11-30 RX ORDER — BLOOD-GLUCOSE SENSOR
EACH MISCELLANEOUS
Qty: 3 EACH | Refills: 11 | Status: SHIPPED | OUTPATIENT
Start: 2023-11-30 | End: 2023-12-21 | Stop reason: SDUPTHER

## 2023-11-30 RX ORDER — GLIPIZIDE 5 MG/1
5 TABLET, FILM COATED, EXTENDED RELEASE ORAL
Qty: 90 TABLET | Refills: 0 | Status: SHIPPED | OUTPATIENT
Start: 2023-11-30 | End: 2023-12-21

## 2023-11-30 RX ORDER — SEMAGLUTIDE 0.68 MG/ML
0.5 INJECTION, SOLUTION SUBCUTANEOUS
Qty: 9 ML | Refills: 5 | Status: SHIPPED | OUTPATIENT
Start: 2023-11-30 | End: 2023-12-21 | Stop reason: DRUGHIGH

## 2023-11-30 NOTE — TELEPHONE ENCOUNTER
----- Message from Sowmya Garcia sent at 11/30/2023  8:16 AM CST -----  Regarding: same day appt  Name of who is calling:   Mallika      What is the request in detail: Pt is requesting a call back in ref to scheduling a same day appt today due to extreme high blood sugars and vision impaired      Can the clinic reply by [facility]:yes      What number to call back if not [facility]: [phone]

## 2023-11-30 NOTE — PROGRESS NOTES
Subjective     Patient ID: Mallika Corbin is a 62 y.o. female.    Chief Complaint: Diabetes      HPI  establish care.  A1c has risen, due for recheck.  Bs has been 200-300s.  Today in clinic 172.  Using jardiance.  Had an issue refilling her ozempic a few months ago and that's when bs increased.  Reports utd on DM eye exam.  Has had dm education as well.    Past Medical History:   Diagnosis Date    Asthma     Hiatal hernia     Hidradenitis suppurativa     pt is unsure of date or year    High cholesterol     Mitral valve prolapse     Reflux      Review of patient's allergies indicates:   Allergen Reactions    Bactrim [sulfamethoxazole-trimethoprim] Other (See Comments)     Chills, fever    Hydrocodone-acetaminophen Itching     Past Surgical History:   Procedure Laterality Date    APPENDECTOMY       SECTION      pyloric stenosis      infant    TOTAL ABDOMINAL HYSTERECTOMY      ovaries in place still.     Family History   Problem Relation Age of Onset    Vision loss Mother     Diabetes Mother     Hypertension Mother     Coronary artery disease Mother     Kidney disease Father     Cancer Father     Hypertension Father     Diabetes Father     Vision loss Sister     Kidney disease Sister     Hypertension Sister     Heart disease Sister     Diabetes Sister     Cancer Maternal Grandmother     Breast cancer Maternal Grandmother      Social History     Socioeconomic History    Marital status: Single   Tobacco Use    Smoking status: Former     Current packs/day: 0.00     Average packs/day: 0.8 packs/day for 33.0 years (24.8 ttl pk-yrs)     Types: Cigarettes     Start date: 5/15/1990     Quit date: 5/15/2023     Years since quittin.5    Smokeless tobacco: Never   Substance and Sexual Activity    Alcohol use: No    Drug use: No     Social Determinants of Health     Financial Resource Strain: High Risk (2023)    Overall Financial Resource Strain (CARDIA)     Difficulty of Paying Living Expenses: Very hard  "  Food Insecurity: Food Insecurity Present (11/29/2023)    Hunger Vital Sign     Worried About Running Out of Food in the Last Year: Often true     Ran Out of Food in the Last Year: Often true   Transportation Needs: No Transportation Needs (11/29/2023)    PRAPARE - Transportation     Lack of Transportation (Medical): No     Lack of Transportation (Non-Medical): No   Physical Activity: Inactive (11/29/2023)    Exercise Vital Sign     Days of Exercise per Week: 0 days     Minutes of Exercise per Session: 0 min   Stress: Stress Concern Present (11/29/2023)    [employer] [place] [employer] - Occupational Stress Questionnaire     Feeling of Stress : To some extent   Social Connections: Unknown (11/29/2023)    Social Connection and Isolation Panel [NHANES]     Frequency of Communication with Friends and Family: Twice a week     Frequency of Social Gatherings with Friends and Family: Once a week     Active Member of Clubs or Organizations: No     Attends Club or Organization Meetings: Never     Marital Status:    Housing Stability: Low Risk  (11/29/2023)    Housing Stability Vital Sign     Unable to Pay for Housing in the Last Year: No     Number of Places Lived in the Last Year: 1     Unstable Housing in the Last Year: No         /77   Pulse 79   Temp 98.1 °F (36.7 °C)   Ht 5' 3" (1.6 m)   Wt 78 kg (171 lb 15.3 oz)   SpO2 97%   BMI 30.46 kg/m²   Outpatient Medications as of 11/30/2023   Medication Sig Dispense Refill    albuterol (PROVENTIL HFA) 90 mcg/actuation inhaler Inhale 2 puffs into the lungs every 6 (six) hours as needed for Wheezing. (Rescue inhaler) 18 g 11    blood-glucose meter,continuous (DEXCOM ) [facility] Needs for continuous glucose monitoring due to uncontrolled diabetes. 2 each 11    empagliflozin (JARDIANCE) 25 mg tablet Take 1 tablet (25 mg total) by mouth once daily. 90 tablet 0    famotidine (PEPCID) 20 MG tablet Take 20 mg by mouth.      glipiZIDE 5 MG TR24 Take 1 " tablet (5 mg total) by mouth daily with breakfast. 90 tablet 0    inhalation spacing device Use as directed when taking inhaled medicine 1 each 0    montelukast (SINGULAIR) 10 mg tablet Take 10 mg by mouth every evening.      nicotine (NICODERM CQ) 14 mg/24 hr 1 patch.      nicotine (NICODERM CQ) 21 mg/24 hr 1 patch.      nicotine (NICODERM CQ) 7 mg/24 hr SMARTSIG:Topical      pravastatin (PRAVACHOL) 40 MG tablet Take 40 mg by mouth.      promethazine-dextromethorphan (PROMETHAZINE-DM) 6.25-15 mg/5 mL Syrp Take 5 mLs by mouth.      solifenacin (VESICARE) 5 MG tablet Take 1 tablet (5 mg total) by mouth once daily. 30 tablet 11    valsartan (DIOVAN) 40 MG tablet Take 40 mg by mouth once daily.       No current facility-administered medications on file as of 11/30/2023.       Review of Systems   All other systems reviewed and are negative.         Objective     Physical Exam  Constitutional:       General: She is not in acute distress.     Appearance: Normal appearance. She is not ill-appearing, toxic-appearing or diaphoretic.   HENT:      Head: Normocephalic and atraumatic.      Mouth/Throat:      Mouth: Mucous membranes are moist.   Cardiovascular:      Rate and Rhythm: Normal rate.   Pulmonary:      Effort: Pulmonary effort is normal.   Skin:     General: Skin is dry.   Neurological:      General: No focal deficit present.      Mental Status: She is alert and oriented to person, place, and time.   Psychiatric:         Mood and Affect: Mood normal.         Behavior: Behavior normal.            Assessment and Plan     1. Diabetes mellitus with coincident hypertension  -     Hemoglobin A1C; Future; Expected date: 11/30/2023  -     Microalbumin/creatinine urine ratio; Future; Expected date: 11/30/2023  -     Discontinue: semaglutide (OZEMPIC) 0.25 mg or 0.5 mg (2 mg/3 mL) pen injector; Inject 0.5 mg into the skin every 7 days.  Dispense: 9 mL; Refill: 5  -     semaglutide (OZEMPIC) 0.25 mg or 0.5 mg (2 mg/3 mL) pen  injector; Inject 0.5 mg into the skin every 7 days.  Dispense: 9 mL; Refill: 5  -     blood-glucose meter,continuous (DEXCOM ) Misc; Needs for continuous glucose monitoring due to uncontrolled diabetes.  Dispense: 2 each; Refill: 11  -     glipiZIDE 5 MG TR24; Take 1 tablet (5 mg total) by mouth daily with breakfast.  Dispense: 90 tablet; Refill: 0  -     POCT Glucose, Hand-Held Device; Future; Expected date: 11/30/2023    2. Diabetes mellitus due to underlying condition with hyperglycemia, without long-term current use of insulin  -     Hemoglobin A1C; Future; Expected date: 11/30/2023  -     Microalbumin/creatinine urine ratio; Future; Expected date: 11/30/2023  -     Discontinue: semaglutide (OZEMPIC) 0.25 mg or 0.5 mg (2 mg/3 mL) pen injector; Inject 0.5 mg into the skin every 7 days.  Dispense: 9 mL; Refill: 5  -     semaglutide (OZEMPIC) 0.25 mg or 0.5 mg (2 mg/3 mL) pen injector; Inject 0.5 mg into the skin every 7 days.  Dispense: 9 mL; Refill: 5  -     blood-glucose meter,continuous (DEXCOM ) Misc; Needs for continuous glucose monitoring due to uncontrolled diabetes.  Dispense: 2 each; Refill: 11  -     glipiZIDE 5 MG TR24; Take 1 tablet (5 mg total) by mouth daily with breakfast.  Dispense: 90 tablet; Refill: 0  -     POCT Glucose, Hand-Held Device; Future; Expected date: 11/30/2023    3. Need for pneumococcal 20-valent conjugate vaccination  -     (In Office Administered) Pneumococcal Conjugate Vaccine (20 Valent) (IM) (Preferred)         3mo f/u  Has appt scheduled with DM lifestyle/wellness NP already    Immunization History   Administered Date(s) Administered    COVID-19 Vaccine 01/19/2021, 02/17/2021, 11/04/2021    COVID-19, MRNA, LN-S, PF (MODERNA FULL 0.5 ML DOSE) 01/19/2021, 02/17/2021, 11/04/2021    Influenza 10/15/2021    Influenza - Quadrivalent - PF *Preferred* (6 months and older) 01/12/2018    Pneumococcal Conjugate - 20 Valent 11/30/2023    Pneumococcal Polysaccharide - 23  Valent 05/06/2021

## 2023-12-01 LAB
ALBUMIN/CREAT UR: 13.3 UG/MG (ref 0–30)
CREAT UR-MCNC: 45 MG/DL (ref 15–325)
MICROALBUMIN UR DL<=1MG/L-MCNC: 6 UG/ML

## 2023-12-04 ENCOUNTER — NURSE TRIAGE (OUTPATIENT)
Dept: ADMINISTRATIVE | Facility: CLINIC | Age: 63
End: 2023-12-04
Payer: COMMERCIAL

## 2023-12-04 LAB
ALBUMIN: 3.7
ALP ISOS SERPL LEV INH-CCNC: 125 U/L
ALT SERPL-CCNC: 43 UNIT/L
ANION GAP SERPL CALC-SCNC: 4 MMOL/L (ref ?–30)
AST: 20
BILIRUB SERPL-MCNC: 0.6 MG/DL (ref 0.1–1.4)
BUN SERPL-MCNC: 10 MG/DL
BUN/CREAT SERPL: 11
CALCIUM SERPL-MCNC: 9 MG/DL
CHLORIDE SERPL-SCNC: 110 MMOL/L (ref 99–108)
CO2 SERPL-SCNC: 25 MMOL/L
CREAT SERPL-MCNC: 0.9 MG/DL
GLUCOSE SERPL-MCNC: 130 MG/DL
POTASSIUM SERPL-SCNC: 3.2 MMOL/L (ref 3.4–5.3)
PROTEIN TOTAL: 7.5
SODIUM BLD-SCNC: 139 MMOL/L (ref 137–147)

## 2023-12-04 NOTE — TELEPHONE ENCOUNTER
Patient states c/o severe lower back pain rated 9/10 with onset on Saturday, 12/02/23. Patient states she was recently prescribed Ozempic and Glizipide 5 mg and began use on 12/01/23. Patient states onset of headache and lower back pain on 12/02/23.     Patient advised to Go to ED Now for evaluation/treatment. Patient states understanding of care advice and states she will go to the ED after leaving work. Patient advised to adhere to care advice at this time.       Reason for Disposition   SEVERE back pain of sudden onset and age > 60 years    Additional Information   Negative: Passed out (i.e., fainted, collapsed and was not responding)   Negative: Shock suspected (e.g., cold/pale/clammy skin, too weak to stand, low BP, rapid pulse)   Negative: Sounds like a life-threatening emergency to the triager   Negative: Major injury to the back (e.g., MVA, fall > 10 feet or 3 meters, penetrating injury, etc.)   Negative: Pain in the upper back over the ribs (rib cage) that radiates (travels) into the chest   Negative: Pain in the upper back over the ribs (rib cage) and worsened by coughing (or clearly increases with breathing)   Negative: Back pain during pregnancy    Protocols used: Back Pain-A-[place]

## 2023-12-14 ENCOUNTER — NURSE TRIAGE (OUTPATIENT)
Dept: ADMINISTRATIVE | Facility: CLINIC | Age: 63
End: 2023-12-14
Payer: COMMERCIAL

## 2023-12-14 ENCOUNTER — TELEPHONE (OUTPATIENT)
Dept: PRIMARY CARE CLINIC | Facility: CLINIC | Age: 63
End: 2023-12-14
Payer: COMMERCIAL

## 2023-12-14 NOTE — TELEPHONE ENCOUNTER
OOC RN  Patient calling about left ear bleeding, applying pressure to stop bleeding.  Starting last night.  Denies injury.  BRB,   holding tissue.  Blood filled q tip that she stuck in it today.  Holding pressure now.  Care advise is to go to closest ED now. For any new or worsening symptoms to callback OOC RN  Patient VU.   Reason for Disposition   Unexplained bleeding and lasts > 10 minutes or large amount  (Exception: If a few drops of blood, continue with triage.)    Additional Information   Negative: Bloody or clear ear discharge and after a head or face injury    Protocols used: Ear - [id number]

## 2023-12-14 NOTE — TELEPHONE ENCOUNTER
Called pt, advised her to go to the urgent care or ER for ear bleeding. She declined saying her car would not make it to the ER/ She will try to go to the urgent care when she gets off.

## 2023-12-14 NOTE — TELEPHONE ENCOUNTER
----- Message from Joe Mendenhall sent at 12/14/2023 12:16 PM CST -----  Type:  Same Day Appointment Request    Caller is requesting a same day appointment.  Caller declined first available appointment listed below.    Name of Caller: Mallika  When is the first available appointment? 12-  Symptoms: left ear bleeding  Best Call Back Number:[phone]  Additional Information: no

## 2023-12-21 ENCOUNTER — TELEPHONE (OUTPATIENT)
Dept: PRIMARY CARE CLINIC | Facility: CLINIC | Age: 63
End: 2023-12-21
Payer: COMMERCIAL

## 2023-12-21 ENCOUNTER — OFFICE VISIT (OUTPATIENT)
Dept: PRIMARY CARE CLINIC | Facility: CLINIC | Age: 63
End: 2023-12-21
Payer: COMMERCIAL

## 2023-12-21 DIAGNOSIS — E11.9 DIABETES MELLITUS WITH COINCIDENT HYPERTENSION: ICD-10-CM

## 2023-12-21 DIAGNOSIS — E08.65 DIABETES MELLITUS DUE TO UNDERLYING CONDITION WITH HYPERGLYCEMIA, WITHOUT LONG-TERM CURRENT USE OF INSULIN: Primary | ICD-10-CM

## 2023-12-21 DIAGNOSIS — I10 DIABETES MELLITUS WITH COINCIDENT HYPERTENSION: ICD-10-CM

## 2023-12-21 PROCEDURE — 4010F ACE/ARB THERAPY RXD/TAKEN: CPT | Mod: CPTII,95,, | Performed by: INTERNAL MEDICINE

## 2023-12-21 PROCEDURE — 3066F NEPHROPATHY DOC TX: CPT | Mod: CPTII,95,, | Performed by: INTERNAL MEDICINE

## 2023-12-21 PROCEDURE — 3052F HG A1C>EQUAL 8.0%<EQUAL 9.0%: CPT | Mod: CPTII,95,, | Performed by: INTERNAL MEDICINE

## 2023-12-21 PROCEDURE — 99214 OFFICE O/P EST MOD 30 MIN: CPT | Mod: 95,,, | Performed by: INTERNAL MEDICINE

## 2023-12-21 PROCEDURE — 1159F PR MEDICATION LIST DOCUMENTED IN MEDICAL RECORD: ICD-10-PCS | Mod: CPTII,95,, | Performed by: INTERNAL MEDICINE

## 2023-12-21 PROCEDURE — 1159F MED LIST DOCD IN RCRD: CPT | Mod: CPTII,95,, | Performed by: INTERNAL MEDICINE

## 2023-12-21 PROCEDURE — 3052F PR MOST RECENT HEMOGLOBIN A1C LEVEL 8.0 - < 9.0%: ICD-10-PCS | Mod: CPTII,95,, | Performed by: INTERNAL MEDICINE

## 2023-12-21 PROCEDURE — 99214 PR OFFICE/OUTPT VISIT, EST, LEVL IV, 30-39 MIN: ICD-10-PCS | Mod: 95,,, | Performed by: INTERNAL MEDICINE

## 2023-12-21 PROCEDURE — 4010F PR ACE/ARB THEARPY RXD/TAKEN: ICD-10-PCS | Mod: CPTII,95,, | Performed by: INTERNAL MEDICINE

## 2023-12-21 PROCEDURE — 3061F NEG MICROALBUMINURIA REV: CPT | Mod: CPTII,95,, | Performed by: INTERNAL MEDICINE

## 2023-12-21 PROCEDURE — 3061F PR NEG MICROALBUMINURIA RESULT DOCUMENTED/REVIEW: ICD-10-PCS | Mod: CPTII,95,, | Performed by: INTERNAL MEDICINE

## 2023-12-21 PROCEDURE — 3066F PR DOCUMENTATION OF TREATMENT FOR NEPHROPATHY: ICD-10-PCS | Mod: CPTII,95,, | Performed by: INTERNAL MEDICINE

## 2023-12-21 RX ORDER — BLOOD-GLUCOSE SENSOR
EACH MISCELLANEOUS
Qty: 4 EACH | Refills: 11 | Status: SHIPPED | OUTPATIENT
Start: 2023-12-21 | End: 2024-02-02 | Stop reason: SDUPTHER

## 2023-12-21 RX ORDER — SEMAGLUTIDE 1.34 MG/ML
1 INJECTION, SOLUTION SUBCUTANEOUS
Qty: 12 ML | Refills: 11 | Status: SHIPPED | OUTPATIENT
Start: 2023-12-21

## 2023-12-21 RX ORDER — BLOOD-GLUCOSE,RECEIVER,CONT
EACH MISCELLANEOUS
Qty: 4 EACH | Refills: 11 | Status: SHIPPED | OUTPATIENT
Start: 2023-12-21 | End: 2024-02-06 | Stop reason: SDUPTHER

## 2023-12-21 NOTE — PROGRESS NOTES
The patient location is: [place]  The chief complaint leading to consultation is: f/u diabetes    Visit type: audiovisual    Face to Face time with patient:   30 minutes of total time spent on the encounter, which includes face to face time and non-face to face time preparing to see the patient (eg, review of tests), Obtaining and/or reviewing separately obtained history, Documenting clinical information in the electronic or other health record, Independently interpreting results (not separately reported) and communicating results to the patient/family/caregiver, or Care coordination (not separately reported).         Each patient to whom he or she provides medical services by telemedicine is:  (1) informed of the relationship between the physician and patient and the respective role of any other health care provider with respect to management of the patient; and (2) notified that he or she may decline to receive medical services by telemedicine and may withdraw from such care at any time.    Notes:     Subjective     Patient ID: Mallika Corbin is a 63 y.o. female.    Chief Complaint: Blood Sugar Problem      Diabetes  She has type 2 diabetes mellitus. No MedicAlert identification noted. The initial diagnosis of diabetes was made 4 years ago. Hypoglycemia symptoms include dizziness, headaches, nervousness/anxiousness, sweats and tremors. Pertinent negatives for hypoglycemia include no confusion, hunger, mood changes, pallor, seizures, sleepiness or speech difficulty. Associated symptoms include blurred vision. Hypoglycemia complications include nocturnal hypoglycemia. Pertinent negatives for hypoglycemia complications include no blackouts, no hospitalization, no required assistance and no required glucagon injection. Symptoms are improving. Diabetic complications include heart disease and retinopathy. Pertinent negatives for diabetic complications include no autonomic neuropathy, CVA, nephropathy, peripheral  neuropathy or PVD. Risk factors for coronary artery disease include dyslipidemia, family history, hypertension and diabetes mellitus. Current diabetic treatment includes oral agent (dual therapy). She is compliant with treatment all of the time. Her weight is decreasing steadily. She is following a low fat/cholesterol diet. When asked about meal planning, she reported none and avoidance of concentrated sweets. Meal planning includes none and avoidance of concentrated sweets. She has had a previous visit with a dietitian. She never participates in exercise. Her home blood glucose trend is fluctuating dramatically. She does not see a podiatrist.Eye exam is current.     Has had some numbers in the 70s since using glipizide and she feels shaky with that.  Otw, no issues with ozempic.  Likes the dexcom.  We discussed carb counting and goals.    Past Medical History:   Diagnosis Date    Asthma     Hiatal hernia     Hidradenitis suppurativa     pt is unsure of date or year    High cholesterol     Mitral valve prolapse     Reflux      Review of patient's allergies indicates:   Allergen Reactions    Bactrim [sulfamethoxazole-trimethoprim] Other (See Comments)     Chills, fever    Hydrocodone-acetaminophen Itching     Past Surgical History:   Procedure Laterality Date    APPENDECTOMY       SECTION      pyloric stenosis      infant    TOTAL ABDOMINAL HYSTERECTOMY      ovaries in place still.     Family History   Problem Relation Age of Onset    Vision loss Mother     Diabetes Mother     Hypertension Mother     Coronary artery disease Mother     Kidney disease Father     Cancer Father     Hypertension Father     Diabetes Father     Vision loss Sister     Kidney disease Sister     Hypertension Sister     Heart disease Sister     Diabetes Sister     Cancer Maternal Grandmother     Breast cancer Maternal Grandmother      Social History     Socioeconomic History    Marital status: Single   Tobacco Use    Smoking status:  Former     Current packs/day: 0.00     Average packs/day: 0.8 packs/day for 33.0 years (24.8 ttl pk-yrs)     Types: Cigarettes     Start date: 5/15/1990     Quit date: 5/15/2023     Years since quittin.6    Smokeless tobacco: Never   Substance and Sexual Activity    Alcohol use: No    Drug use: No     Social Determinants of Health     Financial Resource Strain: High Risk (2023)    Overall Financial Resource Strain (CARDIA)     Difficulty of Paying Living Expenses: Very hard   Food Insecurity: Food Insecurity Present (2023)    Hunger Vital Sign     Worried About Running Out of Food in the Last Year: Often true     Ran Out of Food in the Last Year: Often true   Transportation Needs: No Transportation Needs (2023)    PRAPARE - Transportation     Lack of Transportation (Medical): No     Lack of Transportation (Non-Medical): No   Physical Activity: Inactive (2023)    Exercise Vital Sign     Days of Exercise per Week: 0 days     Minutes of Exercise per Session: 0 min   Stress: Stress Concern Present (2023)    [employer] [place] [employer] - Occupational Stress Questionnaire     Feeling of Stress : To some extent   Social Connections: Unknown (2023)    Social Connection and Isolation Panel [NHANES]     Frequency of Communication with Friends and Family: Twice a week     Frequency of Social Gatherings with Friends and Family: Once a week     Active Member of Clubs or Organizations: No     Attends Club or Organization Meetings: Never     Marital Status:    Housing Stability: Low Risk  (2023)    Housing Stability Vital Sign     Unable to Pay for Housing in the Last Year: No     Number of Places Lived in the Last Year: 1     Unstable Housing in the Last Year: No         There were no vitals taken for this visit.  Outpatient Medications as of 2023   Medication Sig Dispense Refill    albuterol (PROVENTIL HFA) 90 mcg/actuation inhaler Inhale 2 puffs into the  lungs every 6 (six) hours as needed for Wheezing. (Rescue inhaler) 18 g 11    blood-glucose meter,continuous (DEXCOM G7 ) Misc Use as directed 4 each 11    blood-glucose sensor (DEXCOM G7 SENSOR) Abigail Needs for continuous glucose monitoring due to uncontrolled diabetes. 4 each 11    empagliflozin (JARDIANCE) 25 mg tablet Take 1 tablet (25 mg total) by mouth once daily. 90 tablet 0    famotidine (PEPCID) 20 MG tablet Take 20 mg by mouth.      inhalation spacing device Use as directed when taking inhaled medicine 1 each 0    montelukast (SINGULAIR) 10 mg tablet Take 10 mg by mouth every evening.      nicotine (NICODERM CQ) 14 mg/24 hr 1 patch.      nicotine (NICODERM CQ) 21 mg/24 hr 1 patch.      nicotine (NICODERM CQ) 7 mg/24 hr SMARTSIG:Topical      pravastatin (PRAVACHOL) 40 MG tablet Take 40 mg by mouth.      solifenacin (VESICARE) 5 MG tablet Take 1 tablet (5 mg total) by mouth once daily. 30 tablet 11    valsartan (DIOVAN) 40 MG tablet Take 40 mg by mouth once daily.       No current facility-administered medications on file as of 12/21/2023.       Review of Systems   Eyes:  Positive for blurred vision.   Integumentary:  Negative for pallor.   Neurological:  Positive for dizziness, tremors and headaches. Negative for seizures and speech difficulty.   Psychiatric/Behavioral:  Negative for confusion. The patient is nervous/anxious.           Objective     Physical Exam  Constitutional:       Appearance: Normal appearance.   Pulmonary:      Effort: No respiratory distress.   Neurological:      General: No focal deficit present.      Mental Status: She is alert and oriented to person, place, and time.   Psychiatric:         Mood and Affect: Mood normal.         Behavior: Behavior normal.            Assessment and Plan     1. Diabetes mellitus due to underlying condition with hyperglycemia, without long-term current use of insulin  -     semaglutide (OZEMPIC) 1 mg/dose (4 mg/3 mL); Inject 1 mg into the skin  every 7 days.  Dispense: 12 mL; Refill: 11  -     blood-glucose meter,continuous (DEXCOM G7 ) Misc; Use as directed  Dispense: 4 each; Refill: 11  -     blood-glucose sensor (DEXCOM G7 SENSOR) Abigail; Needs for continuous glucose monitoring due to uncontrolled diabetes.  Dispense: 4 each; Refill: 11    2. Diabetes mellitus with coincident hypertension  -     blood-glucose meter,continuous (DEXCOM G7 ) Misc; Use as directed  Dispense: 4 each; Refill: 11  -     blood-glucose sensor (DEXCOM G7 SENSOR) Abigail; Needs for continuous glucose monitoring due to uncontrolled diabetes.  Dispense: 4 each; Refill: 11           Send log     Immunization History   Administered Date(s) Administered    COVID-19 Vaccine 01/19/2021, 02/17/2021, 11/04/2021    COVID-19, MRNA, LN-S, PF (MODERNA FULL 0.5 ML DOSE) 01/19/2021, 02/17/2021, 11/04/2021    Influenza 10/15/2021    Influenza - Quadrivalent - PF *Preferred* (6 months and older) 01/12/2018    Pneumococcal Conjugate - 20 Valent 11/30/2023    Pneumococcal Polysaccharide - 23 Valent 05/06/2021

## 2023-12-21 NOTE — TELEPHONE ENCOUNTER
"----- Message from Shantel Chairez MD sent at 12/21/2023  9:03 AM CST -----  Contact: @ [phone]  I don't know what this means "very low".  Please triage patient and/or offer them a video visit to discuss and dedicate time to their concerns.  ----- Message -----  From: Ame Pleitez MA  Sent: 12/21/2023   8:41 AM CST  To: Shantel Chairez MD      ----- Message -----  From: May Gale  Sent: 12/21/2023   8:00 AM CST  To: [facility] Staff    Pt called in regards to inform her that blood sugar level is very low even after eating and she wants to know do she need to adjust her mediation .....Please call and adv @ [phone]          "

## 2023-12-21 NOTE — TELEPHONE ENCOUNTER
Called pt, stated her blood sugar was 77, went up to 100 then back down. Offered a vv pt accepted.

## 2024-01-18 ENCOUNTER — TELEPHONE (OUTPATIENT)
Dept: INTERNAL MEDICINE | Facility: CLINIC | Age: 64
End: 2024-01-18
Payer: COMMERCIAL

## 2024-01-18 NOTE — TELEPHONE ENCOUNTER
----- Message from Obie Maloney sent at 1/18/2024  9:19 AM CST -----  Type:  Same Day Appointment Request    Caller is requesting a same day appointment.  Caller declined first available appointment listed below.    Name of Caller:pt  When is the first available appointment?1/23  Symptoms:ear infections  Best Call Back Number: [phone]  Additional Information:

## 2024-01-22 ENCOUNTER — OFFICE VISIT (OUTPATIENT)
Dept: INTERNAL MEDICINE | Facility: CLINIC | Age: 64
End: 2024-01-22
Payer: COMMERCIAL

## 2024-01-22 ENCOUNTER — HOSPITAL ENCOUNTER (OUTPATIENT)
Dept: RADIOLOGY | Facility: HOSPITAL | Age: 64
Discharge: HOME | End: 2024-01-22
Attending: PEDIATRICS
Payer: COMMERCIAL

## 2024-01-22 VITALS
OXYGEN SATURATION: 97 % | HEART RATE: 86 BPM | DIASTOLIC BLOOD PRESSURE: 82 MMHG | BODY MASS INDEX: 29.64 KG/M2 | WEIGHT: 167.31 LBS | SYSTOLIC BLOOD PRESSURE: 114 MMHG | TEMPERATURE: 97 F | HEIGHT: 63 IN

## 2024-01-22 DIAGNOSIS — J32.0 LEFT MAXILLARY SINUSITIS: ICD-10-CM

## 2024-01-22 DIAGNOSIS — J32.0 LEFT MAXILLARY SINUSITIS: Primary | ICD-10-CM

## 2024-01-22 PROCEDURE — 1159F MED LIST DOCD IN RCRD: CPT | Mod: CPTII,S$GLB,, | Performed by: PEDIATRICS

## 2024-01-22 PROCEDURE — 70220 X-RAY EXAM OF SINUSES: CPT | Mod: TC

## 2024-01-22 PROCEDURE — 70220 X-RAY EXAM OF SINUSES: CPT | Mod: 26,,, | Performed by: RADIOLOGY

## 2024-01-22 PROCEDURE — 1160F RVW MEDS BY RX/DR IN RCRD: CPT | Mod: CPTII,S$GLB,, | Performed by: PEDIATRICS

## 2024-01-22 PROCEDURE — 3079F DIAST BP 80-89 MM HG: CPT | Mod: CPTII,S$GLB,, | Performed by: PEDIATRICS

## 2024-01-22 PROCEDURE — 3072F LOW RISK FOR RETINOPATHY: CPT | Mod: CPTII,S$GLB,, | Performed by: PEDIATRICS

## 2024-01-22 PROCEDURE — 99999 PR PBB SHADOW E&M-EST. PATIENT-LVL IV: CPT | Mod: PBBFAC,,, | Performed by: PEDIATRICS

## 2024-01-22 PROCEDURE — 99214 OFFICE O/P EST MOD 30 MIN: CPT | Mod: S$GLB,,, | Performed by: PEDIATRICS

## 2024-01-22 PROCEDURE — 3008F BODY MASS INDEX DOCD: CPT | Mod: CPTII,S$GLB,, | Performed by: PEDIATRICS

## 2024-01-22 PROCEDURE — 3074F SYST BP LT 130 MM HG: CPT | Mod: CPTII,S$GLB,, | Performed by: PEDIATRICS

## 2024-01-22 RX ORDER — AMOXICILLIN 875 MG/1
875 TABLET, FILM COATED ORAL EVERY 12 HOURS
Qty: 20 TABLET | Refills: 0 | Status: SHIPPED | OUTPATIENT
Start: 2024-01-22 | End: 2024-02-01

## 2024-01-22 NOTE — PROGRESS NOTES
Subjective     Patient ID: Mallika Corbin is a 63 y.o. female.    Chief Complaint: Facial Swelling and Ear Drainage    Mallika Corbin is a 63 y.o. female who is here for sxs of facial swelling around the left eye over the past week. She also reports postnasal drip. Reports no perfume, soap, detergent changes. She has recently started ozempic. No visual disturbance or eye pain. Always has sinus drip. Reports BS . No tooth pain.       Review of Systems   Constitutional:  Negative for chills and fever.   HENT:  Positive for postnasal drip. Negative for nasal congestion and rhinorrhea.    Eyes:  Negative for pain and discharge.   Respiratory:  Negative for cough and shortness of breath.    Cardiovascular:  Negative for chest pain.   Gastrointestinal:  Negative for abdominal pain, blood in stool, change in bowel habit, constipation, diarrhea and nausea.   Endocrine: Negative for cold intolerance, heat intolerance, polydipsia, polyphagia and polyuria.   Genitourinary:  Negative for dysuria.   Neurological:  Negative for weakness.          Objective     Physical Exam  Constitutional:       General: She is not in acute distress.     Appearance: Normal appearance. She is normal weight. She is not ill-appearing or toxic-appearing.   HENT:      Head:      Comments: Left maxillary sinus tenderness to percussion.      Right Ear: Tympanic membrane normal. There is no impacted cerumen.      Left Ear: Tympanic membrane normal. There is no impacted cerumen.      Ears:      Comments: Canals dry     Nose: No congestion or rhinorrhea.      Mouth/Throat:      Pharynx: No oropharyngeal exudate or posterior oropharyngeal erythema.   Eyes:      Extraocular Movements: Extraocular movements intact.      Conjunctiva/sclera: Conjunctivae normal.      Pupils: Pupils are equal, round, and reactive to light.      Comments: Bilateral left greater than right upper and lower lid edema. Mildly pink. No redness. No discharge.     Cardiovascular:      Rate and Rhythm: Normal rate and regular rhythm.      Pulses: Normal pulses.      Heart sounds: Normal heart sounds. No murmur heard.     No gallop.   Pulmonary:      Effort: Pulmonary effort is normal. No respiratory distress.      Breath sounds: Normal breath sounds. No stridor. No wheezing, rhonchi or rales.   Chest:      Chest wall: No tenderness.   Abdominal:      General: There is no distension.      Palpations: There is no mass.      Tenderness: There is no abdominal tenderness.      Hernia: No hernia is present.   Neurological:      General: No focal deficit present.      Mental Status: She is alert and oriented to person, place, and time. Mental status is at baseline.      Cranial Nerves: No cranial nerve deficit.      Motor: No weakness.      Gait: Gait normal.   Psychiatric:         Mood and Affect: Mood normal.         Behavior: Behavior normal.         Thought Content: Thought content normal.         Judgment: Judgment normal.            Assessment and Plan     1. Left maxillary sinusitis  -     amoxicillin (AMOXIL) 875 MG tablet; Take 1 tablet (875 mg total) by mouth every 12 (twelve) hours. for 10 days  Dispense: 20 tablet; Refill: 0  -     X-Ray Sinuses 3 or more views; Future; Expected date: 01/22/2024         Will have x-ray of sinus. Will start amoxicillin for presumed sinusitis as she is tender over left maxillary sinus. Afrin and Flonase for symptomatic treatment. See dentist due to multiple lost teeth to rule out dental cause. If sxs do not resolve refer to ENT and Dr. Chairez to consider angio  edema secondary to ozempic         No follow-ups on file.

## 2024-02-02 ENCOUNTER — OFFICE VISIT (OUTPATIENT)
Dept: PRIMARY CARE CLINIC | Facility: CLINIC | Age: 64
End: 2024-02-02
Payer: COMMERCIAL

## 2024-02-02 DIAGNOSIS — E11.9 DIABETES MELLITUS WITH COINCIDENT HYPERTENSION: ICD-10-CM

## 2024-02-02 DIAGNOSIS — H92.13 OTORRHEA OF BOTH EARS: Primary | ICD-10-CM

## 2024-02-02 DIAGNOSIS — H93.8X3 SENSATION OF FULLNESS IN BOTH EARS: ICD-10-CM

## 2024-02-02 DIAGNOSIS — E66.9 TYPE 2 DIABETES MELLITUS WITH OBESITY: ICD-10-CM

## 2024-02-02 DIAGNOSIS — E08.65 DIABETES MELLITUS DUE TO UNDERLYING CONDITION WITH HYPERGLYCEMIA, WITHOUT LONG-TERM CURRENT USE OF INSULIN: ICD-10-CM

## 2024-02-02 DIAGNOSIS — I10 DIABETES MELLITUS WITH COINCIDENT HYPERTENSION: ICD-10-CM

## 2024-02-02 DIAGNOSIS — E11.65 TYPE 2 DIABETES MELLITUS WITH HYPERGLYCEMIA, WITHOUT LONG-TERM CURRENT USE OF INSULIN: Chronic | ICD-10-CM

## 2024-02-02 DIAGNOSIS — E11.69 TYPE 2 DIABETES MELLITUS WITH OBESITY: ICD-10-CM

## 2024-02-02 DIAGNOSIS — J01.40 SUBACUTE PANSINUSITIS: ICD-10-CM

## 2024-02-02 PROCEDURE — 99213 OFFICE O/P EST LOW 20 MIN: CPT | Mod: 95,,, | Performed by: INTERNAL MEDICINE

## 2024-02-02 PROCEDURE — 3072F LOW RISK FOR RETINOPATHY: CPT | Mod: CPTII,95,, | Performed by: INTERNAL MEDICINE

## 2024-02-02 RX ORDER — AZITHROMYCIN 250 MG/1
TABLET, FILM COATED ORAL
Qty: 6 TABLET | Refills: 0 | Status: SHIPPED | OUTPATIENT
Start: 2024-02-02 | End: 2024-02-07

## 2024-02-02 RX ORDER — BLOOD-GLUCOSE SENSOR
EACH MISCELLANEOUS
Qty: 4 EACH | Refills: 11 | Status: SHIPPED | OUTPATIENT
Start: 2024-02-02 | End: 2024-02-14 | Stop reason: SDUPTHER

## 2024-02-02 RX ORDER — CIPROFLOXACIN AND DEXAMETHASONE 3; 1 MG/ML; MG/ML
4 SUSPENSION/ DROPS AURICULAR (OTIC) 2 TIMES DAILY
Qty: 7.5 ML | Refills: 1 | Status: SHIPPED | OUTPATIENT
Start: 2024-02-02 | End: 2024-02-09

## 2024-02-02 NOTE — PROGRESS NOTES
The patient location is: [place]  The chief complaint leading to consultation is: ears    Visit type: audiovisual    Face to Face time with patient:   15 minutes of total time spent on the encounter, which includes face to face time and non-face to face time preparing to see the patient (eg, review of tests), Obtaining and/or reviewing separately obtained history, Documenting clinical information in the electronic or other health record, Independently interpreting results (not separately reported) and communicating results to the patient/family/caregiver, or Care coordination (not separately reported).         Each patient to whom he or she provides medical services by telemedicine is:  (1) informed of the relationship between the physician and patient and the respective role of any other health care provider with respect to management of the patient; and (2) notified that he or she may decline to receive medical services by telemedicine and may withdraw from such care at any time.    Notes:     Subjective     Patient ID: Mallika Corbin is a 63 y.o. female.    Chief Complaint: No chief complaint on file.  ears    Otalgia   There is pain in both ears. This is a new problem. The current episode started 1 to 4 weeks ago. The problem occurs constantly. The problem has been gradually worsening. There has been no fever. The fever has been present for Less than 1 day. The pain is at a severity of 5/10. The pain is mild. Associated symptoms include drainage, ear discharge, hearing loss and rhinorrhea. Pertinent negatives include no abdominal pain, coughing, diarrhea, headaches, neck pain, rash, sore throat or vomiting. She has tried antibiotics for the symptoms. The treatment provided no relief. There is no history of a chronic ear infection, hearing loss or a tympanostomy tube.       Past Medical History:   Diagnosis Date    Asthma     Hiatal hernia     Hidradenitis suppurativa     pt is unsure of date or year    High  cholesterol     Mitral valve prolapse     Reflux      Review of patient's allergies indicates:   Allergen Reactions    Bactrim [sulfamethoxazole-trimethoprim] Other (See Comments)     Chills, fever    Hydrocodone-acetaminophen Itching     Past Surgical History:   Procedure Laterality Date    APPENDECTOMY       SECTION      pyloric stenosis      infant    TOTAL ABDOMINAL HYSTERECTOMY      ovaries in place still.     Family History   Problem Relation Age of Onset    Vision loss Mother     Diabetes Mother     Hypertension Mother     Coronary artery disease Mother     Kidney disease Father     Cancer Father     Hypertension Father     Diabetes Father     Vision loss Sister     Kidney disease Sister     Hypertension Sister     Heart disease Sister     Diabetes Sister     Cancer Maternal Grandmother     Breast cancer Maternal Grandmother      Social History     Socioeconomic History    Marital status: Single   Tobacco Use    Smoking status: Some Days     Current packs/day: 0.00     Average packs/day: 0.8 packs/day for 33.0 years (24.8 ttl pk-yrs)     Types: Cigarettes     Start date: 5/15/1990     Last attempt to quit: 5/15/2023     Years since quittin.7    Smokeless tobacco: Never   Substance and Sexual Activity    Alcohol use: No    Drug use: No     Social Determinants of Health     Financial Resource Strain: High Risk (2023)    Overall Financial Resource Strain (CARDIA)     Difficulty of Paying Living Expenses: Very hard   Food Insecurity: Food Insecurity Present (2023)    Hunger Vital Sign     Worried About Running Out of Food in the Last Year: Often true     Ran Out of Food in the Last Year: Often true   Transportation Needs: No Transportation Needs (2023)    PRAPARE - Transportation     Lack of Transportation (Medical): No     Lack of Transportation (Non-Medical): No   Physical Activity: Inactive (2023)    Exercise Vital Sign     Days of Exercise per Week: 0 days     Minutes of  Exercise per Session: 0 min   Stress: Stress Concern Present (11/29/2023)    [employer] [employer] [employer] - Occupational Stress Questionnaire     Feeling of Stress : To some extent   Social Connections: Unknown (11/29/2023)    Social Connection and Isolation Panel [NHANES]     Frequency of Communication with Friends and Family: Twice a week     Frequency of Social Gatherings with Friends and Family: Once a week     Active Member of Clubs or Organizations: No     Attends Club or Organization Meetings: Never     Marital Status:    Housing Stability: Low Risk  (11/29/2023)    Housing Stability Vital Sign     Unable to Pay for Housing in the Last Year: No     Number of Places Lived in the Last Year: 1     Unstable Housing in the Last Year: No         There were no vitals taken for this visit.  Outpatient Medications as of 2/2/2024   Medication Sig Dispense Refill    blood-glucose meter,continuous (DEXCOM G7 ) Misc Use as directed 4 each 11    blood-glucose sensor (DEXCOM G7 SENSOR) Abigail Needs for continuous glucose monitoring due to uncontrolled diabetes. 4 each 11    empagliflozin (JARDIANCE) 25 mg tablet Take 1 tablet (25 mg total) by mouth once daily. (Patient not taking: Reported on 1/22/2024) 90 tablet 0    famotidine (PEPCID) 20 MG tablet Take 20 mg by mouth.      nicotine (NICODERM CQ) 21 mg/24 hr 1 patch.      pravastatin (PRAVACHOL) 40 MG tablet Take 40 mg by mouth.      semaglutide (OZEMPIC) 1 mg/dose (4 mg/3 mL) Inject 1 mg into the skin every 7 days. 12 mL 11    solifenacin (VESICARE) 5 MG tablet Take 1 tablet (5 mg total) by mouth once daily. (Patient not taking: Reported on 1/22/2024) 30 tablet 11    valsartan (DIOVAN) 40 MG tablet Take 40 mg by mouth once daily.       No current facility-administered medications on file as of 2/2/2024.       Review of Systems   HENT:  Positive for ear discharge, ear pain, hearing loss and rhinorrhea. Negative for sore throat.    Respiratory:   Negative for cough.    Gastrointestinal:  Negative for abdominal pain, diarrhea and vomiting.   Musculoskeletal:  Negative for neck pain.   Integumentary:  Negative for rash.   Neurological:  Negative for headaches.          Objective     Physical Exam  Constitutional:       Appearance: Normal appearance.   HENT:      Head: Normocephalic and atraumatic.   Eyes:      General:         Right eye: No discharge.         Left eye: No discharge.      Conjunctiva/sclera: Conjunctivae normal.   Pulmonary:      Effort: No respiratory distress.   Neurological:      Mental Status: She is alert and oriented to person, place, and time.   Psychiatric:         Mood and Affect: Mood normal.         Behavior: Behavior normal.            Assessment and Plan     1. Otorrhea of both ears  -     ciprofloxacin-dexAMETHasone 0.3-0.1% (CIPRODEX) 0.3-0.1 % DrpS; Place 4 drops into both ears 2 (two) times daily. for 7 days  Dispense: 7.5 mL; Refill: 1  -     azithromycin (Z-SILVIA) 250 MG tablet; Take 2 tablets by mouth on day 1; Take 1 tablet by mouth on days 2-5  Dispense: 6 tablet; Refill: 0    2. Subacute pansinusitis  -     azithromycin (Z-SILVIA) 250 MG tablet; Take 2 tablets by mouth on day 1; Take 1 tablet by mouth on days 2-5  Dispense: 6 tablet; Refill: 0    3. Type 2 diabetes mellitus with hyperglycemia, without long-term current use of insulin  Overview:  Intolerant of metformin due to GI side-effects      4. Obesity complicating type 2 diabetes    5. Diabetes mellitus due to underlying condition with hyperglycemia, without long-term current use of insulin  -     blood-glucose sensor (DEXCOM G7 SENSOR) Abigail; Needs for continuous glucose monitoring due to uncontrolled diabetes.  Dispense: 4 each; Refill: 11    6. Diabetes mellitus with coincident hypertension  -     blood-glucose sensor (DEXCOM G7 SENSOR) Abigail; Needs for continuous glucose monitoring due to uncontrolled diabetes.  Dispense: 4 each; Refill: 11             Immunization  History   Administered Date(s) Administered    COVID-19 Vaccine 01/19/2021, 02/17/2021, 11/04/2021    COVID-19, MRNA, LN-S, PF (MODERNA FULL 0.5 ML DOSE) 01/19/2021, 02/17/2021, 11/04/2021    Influenza 10/15/2021    Influenza - Quadrivalent - PF *Preferred* (6 months and older) 01/12/2018    Pneumococcal Conjugate - 20 Valent 11/30/2023    Pneumococcal Polysaccharide - 23 Valent 05/06/2021

## 2024-02-06 DIAGNOSIS — E11.9 DIABETES MELLITUS WITH COINCIDENT HYPERTENSION: ICD-10-CM

## 2024-02-06 DIAGNOSIS — I10 DIABETES MELLITUS WITH COINCIDENT HYPERTENSION: ICD-10-CM

## 2024-02-06 DIAGNOSIS — E08.65 DIABETES MELLITUS DUE TO UNDERLYING CONDITION WITH HYPERGLYCEMIA, WITHOUT LONG-TERM CURRENT USE OF INSULIN: ICD-10-CM

## 2024-02-06 RX ORDER — BLOOD-GLUCOSE,RECEIVER,CONT
EACH MISCELLANEOUS
Qty: 4 EACH | Refills: 11 | Status: SHIPPED | OUTPATIENT
Start: 2024-02-06 | End: 2024-02-21 | Stop reason: SDUPTHER

## 2024-02-06 NOTE — TELEPHONE ENCOUNTER
----- Message from Jeimy Pillai sent at 2/5/2024  3:58 PM CST -----  Type:  Pharmacy Calling to Clarify an RX  Name of Caller:aidan  Pharm[address] Name:  [facility] [place] [address] [place] [id number] [facility] [address]  Phone: [phone] Fax: [phone]  Prescription Name:Tico g7 sensor  What do they need to clarify?:how the rx is taken  Best Call Back Number:[phone]  Additional Information:

## 2024-02-06 NOTE — TELEPHONE ENCOUNTER
Dr. Chairez, pharmacy called Dexcom G7 needs a frequency on the prescription and resent.   Thanks,

## 2024-02-14 DIAGNOSIS — E08.65 DIABETES MELLITUS DUE TO UNDERLYING CONDITION WITH HYPERGLYCEMIA, WITHOUT LONG-TERM CURRENT USE OF INSULIN: ICD-10-CM

## 2024-02-14 DIAGNOSIS — E11.9 DIABETES MELLITUS WITH COINCIDENT HYPERTENSION: ICD-10-CM

## 2024-02-14 DIAGNOSIS — I10 DIABETES MELLITUS WITH COINCIDENT HYPERTENSION: ICD-10-CM

## 2024-02-15 RX ORDER — BLOOD-GLUCOSE SENSOR
EACH MISCELLANEOUS
Qty: 4 EACH | Refills: 11 | Status: SHIPPED | OUTPATIENT
Start: 2024-02-15 | End: 2024-02-29 | Stop reason: SDUPTHER

## 2024-02-21 DIAGNOSIS — E08.65 DIABETES MELLITUS DUE TO UNDERLYING CONDITION WITH HYPERGLYCEMIA, WITHOUT LONG-TERM CURRENT USE OF INSULIN: ICD-10-CM

## 2024-02-21 DIAGNOSIS — E11.9 DIABETES MELLITUS WITH COINCIDENT HYPERTENSION: ICD-10-CM

## 2024-02-21 DIAGNOSIS — I10 DIABETES MELLITUS WITH COINCIDENT HYPERTENSION: ICD-10-CM

## 2024-02-22 RX ORDER — BLOOD-GLUCOSE,RECEIVER,CONT
EACH MISCELLANEOUS
Qty: 4 EACH | Refills: 11 | Status: SHIPPED | OUTPATIENT
Start: 2024-02-22

## 2024-02-25 ENCOUNTER — TELEPHONE (OUTPATIENT)
Dept: PHARMACY | Facility: CLINIC | Age: 64
End: 2024-02-25
Payer: COMMERCIAL

## 2024-02-25 NOTE — TELEPHONE ENCOUNTER
Contacted the patient to perform Medication Therapy Management review, specifically in reference to refilling valsartan to improve PDC for HEDIS measurements.   Patient is no longer taking the medication

## 2024-02-29 ENCOUNTER — OFFICE VISIT (OUTPATIENT)
Dept: OTOLARYNGOLOGY | Facility: CLINIC | Age: 64
End: 2024-02-29
Payer: COMMERCIAL

## 2024-02-29 VITALS — BODY MASS INDEX: 30.3 KG/M2 | WEIGHT: 171.06 LBS

## 2024-02-29 DIAGNOSIS — I10 DIABETES MELLITUS WITH COINCIDENT HYPERTENSION: ICD-10-CM

## 2024-02-29 DIAGNOSIS — E08.65 DIABETES MELLITUS DUE TO UNDERLYING CONDITION WITH HYPERGLYCEMIA, WITHOUT LONG-TERM CURRENT USE OF INSULIN: ICD-10-CM

## 2024-02-29 DIAGNOSIS — J30.9 ALLERGIC RHINITIS, UNSPECIFIED SEASONALITY, UNSPECIFIED TRIGGER: ICD-10-CM

## 2024-02-29 DIAGNOSIS — H61.23 BILATERAL IMPACTED CERUMEN: ICD-10-CM

## 2024-02-29 DIAGNOSIS — F17.210 TOBACCO DEPENDENCE DUE TO CIGARETTES: ICD-10-CM

## 2024-02-29 DIAGNOSIS — E11.9 DIABETES MELLITUS WITH COINCIDENT HYPERTENSION: ICD-10-CM

## 2024-02-29 DIAGNOSIS — J32.9 CHRONIC SINUSITIS, UNSPECIFIED LOCATION: Primary | ICD-10-CM

## 2024-02-29 PROCEDURE — 99204 OFFICE O/P NEW MOD 45 MIN: CPT | Mod: 25,S$GLB,, | Performed by: STUDENT IN AN ORGANIZED HEALTH CARE EDUCATION/TRAINING PROGRAM

## 2024-02-29 PROCEDURE — 1159F MED LIST DOCD IN RCRD: CPT | Mod: CPTII,S$GLB,, | Performed by: STUDENT IN AN ORGANIZED HEALTH CARE EDUCATION/TRAINING PROGRAM

## 2024-02-29 PROCEDURE — 3008F BODY MASS INDEX DOCD: CPT | Mod: CPTII,S$GLB,, | Performed by: STUDENT IN AN ORGANIZED HEALTH CARE EDUCATION/TRAINING PROGRAM

## 2024-02-29 PROCEDURE — 99999 PR PBB SHADOW E&M-EST. PATIENT-LVL III: CPT | Mod: PBBFAC,,, | Performed by: STUDENT IN AN ORGANIZED HEALTH CARE EDUCATION/TRAINING PROGRAM

## 2024-02-29 PROCEDURE — 69210 REMOVE IMPACTED EAR WAX UNI: CPT | Mod: S$GLB,,, | Performed by: STUDENT IN AN ORGANIZED HEALTH CARE EDUCATION/TRAINING PROGRAM

## 2024-02-29 PROCEDURE — 3072F LOW RISK FOR RETINOPATHY: CPT | Mod: CPTII,S$GLB,, | Performed by: STUDENT IN AN ORGANIZED HEALTH CARE EDUCATION/TRAINING PROGRAM

## 2024-02-29 RX ORDER — BLOOD-GLUCOSE SENSOR
EACH MISCELLANEOUS
Qty: 4 EACH | Refills: 11 | Status: SHIPPED | OUTPATIENT
Start: 2024-02-29

## 2024-02-29 RX ORDER — FLUTICASONE PROPIONATE 50 MCG
1 SPRAY, SUSPENSION (ML) NASAL DAILY
Qty: 16 G | Refills: 0 | Status: SHIPPED | OUTPATIENT
Start: 2024-02-29

## 2024-02-29 NOTE — PROGRESS NOTES
Chief complaint:   Chief Complaint   Patient presents with    Cerumen Impaction     Pt states that she got over a very bad sinus infection her ears are blocked up her pcp told her that her ears were very dry but when she puts a Qtip in them it comes out wet and she wakes congested in the mornings it is so unreal          Referring Provider:  Shantel Chairez [place]  [address] [place] [address]    History of Present Illness:     Ms. Corbin is a 63 y.o. female presenting for evaluation of bilateral ear fullness. Onset of this chief complaint was about 4 weeks ago after a bad sinus infection.  Additional symptoms that also have been associated are muffled hearing. The patient denies vertigo, tinnitus.  Treatment has included azithromycin with moderate relief.     Additional symptoms include nasal congestion, facial pressure, post nasal drip, cough. Has issues all year round with sinuses, but worse over the last month.    The patient denies significant hearing loss risk factors, ototoxic medication exposure, chronic vestibular suppressant use, head/ facial/ ann trauma, and otologic surgery.      Never had allergy testing. No nasal steroid or oral antihistamine use.     History        Past Medical History:   Past Medical History:   Diagnosis Date    Asthma     Hiatal hernia     Hidradenitis suppurativa     pt is unsure of date or year    High cholesterol     Mitral valve prolapse     Reflux     .          Past Surgical History:  Past Surgical History:   Procedure Laterality Date    APPENDECTOMY       SECTION      pyloric stenosis      infant    TOTAL ABDOMINAL HYSTERECTOMY      ovaries in place still.   .         Medications: Medication list was reviewed. She  has a current medication list which includes the following prescription(s): dexcom g7 , dexcom g7 sensor, empagliflozin, famotidine, nicotine, pravastatin, ozempic, solifenacin, and valsartan.         Allergies:   Review of  patient's allergies indicates:   Allergen Reactions    Bactrim [sulfamethoxazole-trimethoprim] Other (See Comments)     Chills, fever    Hydrocodone-acetaminophen Itching            Family history: family history includes Breast cancer in her maternal grandmother; Cancer in her father and maternal grandmother; Coronary artery disease in her mother; Diabetes in her father, mother, and sister; Heart disease in her sister; Hypertension in her father, mother, and sister; Kidney disease in her father and sister; Vision loss in her mother and sister.         Social History          Alcohol use:  reports no history of alcohol use.            Tobacco:  reports that she has been smoking cigarettes. She started smoking about 33 years ago. She has a 24.8 pack-year smoking history. She has never used smokeless tobacco.         Please see the patient's intake form for full details of past medical history, past surgical history, family history, social history and review of systems. ?This information was reviewed by me and noted.      Physical Examination     General: Well developed, well nourished, well hydrated. Verbal with a strong voice and not dysphonic.     Head/Face: Normocephalic, atraumatic. No scars or lesions. Facial musculature equal.     Eyes: No scleral icterus or conjunctival hemorrhage. EOMI. PERRLA.     Ears: see procedure    Hearing: grossly intact    Nose: No gross deformity or lesions. No purulent discharge. No significant NSD.  Mucosal inflammation    Mouth/Oropharynx: Lips without any lesions. No mucosal lesions within the oropharynx. No tonsillar exudate or lesions. Pharyngeal walls symmetrical. Uvula midline. Tongue midline without lesions.     Neck: Trachea midline. No masses. No thyromegaly or nodules palpated.     Lymphatic: No lymphadenopathy in the neck.     Extremities: No cyanosis. Warm and well-perfused.     Skin: No scars or lesions on face or neck.      Neurologic: Moving all extremities without  gross abnormality.CN II-XII grossly intact. House-Brackmann 1/6. No signs of nystagmus.      Psych: Alert and oriented to person, place, and time with an appropriate mood and affect.     Procedure: ear microscopy with removal of cerumen    Description: The patient was in agreement with the examination and debridement of the ears. Removal of the cerumen required use of an operating microscope and multiple micro-instruments.     With the patient in the supine position, we used the operating microscope to examine both ears with the appropriate sized ear speculum.  A variety of sterile, micro-instruments were utilized to remove the cerumen atraumatically.  I performed the procedure which required a significant amount of time and effort. The tympanic membrane was then well visualized.  The patient tolerated the procedure well and there were no complications.    Findings:   Right ear had significant wax impacted on the TM, unable to remove    Left ear had significant wax, the EAC was normal, and the tympanic membrane was intact with no evidence of middle ear fluid.     Data review:    Review of records:      I reviewed records from the referring provider's office visits.  These describe the history, workup, and/or treatment of this problem thus far.      Images    I have independently reviewed the following imaging:    XR sinus    Likely left maxillary sinus opacification  Ethmoids and frontals appear clear       Assessment/Plan:      1. Chronic sinusitis, unspecified location    2. Tobacco dependence due to cigarettes    3. Bilateral impacted cerumen    4. Allergic rhinitis, unspecified seasonality, unspecified trigger           Chronic Rhinosinusitis - completed abx with partial improvement. Will start aggressive nasal regimen and f/u with scope in 3 weeks. Scan if significant evidence of Chronic Rhinosinusitis persists   Also discussed importance of smoking cessation for nasal mucosal health (she is down to 5  cigarettes per day)    Cerumen - no q=tips, debrox for one week, repeat cleaning at f/u      Harrison Suggs MD  [facility]   [facility] [address] [place] [facility].  [place] [address]  P: [phone]  F: [phone]

## 2024-02-29 NOTE — PATIENT INSTRUCTIONS
"Sinus Protocol     Sinusitis is caused by inflammation creating blockage in the natural drainage pathways of the sinuses.  This "Sinus Protocol" is designed to open, flush out and decrease the inflammation within those pathways. The oxymetazoline (Afrin), Darwin Med sinus wash, and Fluticasone (Flonase) can all be found over-the-counter, though you may have been provided with a prescription for the Flonase today.         Day 1-5       Afrin (pump spray mist OTC) 2 sprays in each nostril twice daily. Use the Afrin a few minutes before the saline rinse to help open up the nasal cavities and allow the saline rinse to better flush the sinuses  Darwin Med Sinus Wash - Make sure you use distilled water! - once daily   Fluticasone nasal spray 1 sprays in each nostril - twice daily (after doing the sinus rinse)        Days 6 - follow up visit      Darwin Med Sinus wash - once daily  Fluticasone nasal spray 1 sprays in each nostril - once daily (after doing the sinus rinse)    "

## 2024-03-12 ENCOUNTER — PATIENT OUTREACH (OUTPATIENT)
Dept: ADMINISTRATIVE | Facility: HOSPITAL | Age: 64
End: 2024-03-12
Payer: COMMERCIAL

## 2024-03-21 ENCOUNTER — TELEPHONE (OUTPATIENT)
Dept: PHARMACY | Facility: CLINIC | Age: 64
End: 2024-03-21
Payer: COMMERCIAL

## 2024-03-21 NOTE — TELEPHONE ENCOUNTER
Completed chart review and assessed patient medication adherence for [facility].

## 2024-04-09 ENCOUNTER — E-VISIT (OUTPATIENT)
Dept: PRIMARY CARE CLINIC | Facility: CLINIC | Age: 64
End: 2024-04-09
Payer: COMMERCIAL

## 2024-04-09 DIAGNOSIS — R09.89 SINUS COMPLAINT: Primary | ICD-10-CM

## 2024-04-09 PROCEDURE — 99421 OL DIG E/M SVC 5-10 MIN: CPT | Mod: ,,, | Performed by: INTERNAL MEDICINE

## 2024-04-09 RX ORDER — AZELASTINE 1 MG/ML
1 SPRAY, METERED NASAL 2 TIMES DAILY
Qty: 30 ML | Refills: 0 | Status: SHIPPED | OUTPATIENT
Start: 2024-04-09

## 2024-04-09 RX ORDER — AZITHROMYCIN 250 MG/1
TABLET, FILM COATED ORAL
Qty: 6 TABLET | Refills: 0 | Status: SHIPPED | OUTPATIENT
Start: 2024-04-09 | End: 2024-04-14

## 2024-04-09 RX ORDER — MONTELUKAST SODIUM 10 MG/1
10 TABLET ORAL NIGHTLY
Qty: 30 TABLET | Refills: 0 | Status: SHIPPED | OUTPATIENT
Start: 2024-04-09 | End: 2024-05-09

## 2024-04-09 NOTE — PROGRESS NOTES
Patient ID: Mallika Corbin is a 63 y.o. female.    Chief Complaint: URI (Entered automatically based on patient selection in Patient Portal.)    The patient initiated a request through [employer] on 4/9/2024 for evaluation and management with a chief complaint of URI (Entered automatically based on patient selection in Patient Portal.)     I evaluated the questionnaire submission on 4/9.    [facility] Peq Evisit Upper Respitatory/Cough Questionnaire    4/9/2024 11:31 AM CDT - Filed by Patient   Do you agree to participate in an E-Visit? Yes   If you have any of the following symptoms, please present to your local ER or call 911:  I acknowledge   What is the main issue you would like addressed today? Sinus infection   Are you able to take your vital signs? No   What symptoms do you currently have?  Cough;  Headache;  Nasal Congestion;  Pain around the nose and face   Describe your cough: Productive (containing mucus)   Describe the mucus: Clear   Have you ever smoked? I currently smoke   Have you had a fever? No   When did your symptoms first appear? 4/5/2024   In the last two weeks, have you been in close contact with someone who has COVID-19 or the Flu? No   In the last two weeks, have you worked or volunteered in a healthcare facility or as a ? Healthcare facilities include a hospital, medical or dental clinic, long-term care facility, or nursing home Yes   Do you live in a long-term care facility, nursing home, group home, or homeless shelter? No   List what you have done or taken to help your symptoms. Over the counter sinus meds   How severe are your symptoms? Moderate   Have your symptoms improved since they first appeared? No change   Have you taken an at home Covid test? No   Have you taken a Flu test? No   Have you been fully vaccinated for COVID? (2 Pfizer, 2 Moderna or 1 Aquiles & Aquiles vaccine injections) Yes   Have you received a booster? No   Have you recieved a Flu shot? Yes   When did you  recieve your Flu shot? 2024   Do you have transportation to get tested for COVID if it is indicated and ordered for you at an [facility] location? Yes   Provide any additional information you feel is important. Also have watery eyes from sinus pressure   Please attach any relevant images or files          Encounter Diagnosis   Name Primary?    Sinus complaint Yes        No orders of the defined types were placed in this encounter.     Medications Ordered This Encounter   Medications    azelastine (ASTELIN) 137 mcg (0.1 %) nasal spray     Si spray (137 mcg total) by Nasal route 2 (two) times daily.     Dispense:  30 mL     Refill:  0    azithromycin (Z-SILVIA) 250 MG tablet     Sig: Take 2 tablets by mouth on day 1; Take 1 tablet by mouth on days 2-5     Dispense:  6 tablet     Refill:  0    montelukast (SINGULAIR) 10 mg tablet     Sig: Take 1 tablet (10 mg total) by mouth every evening.     Dispense:  30 tablet     Refill:  0              E-Visit Time Trackinm

## 2024-06-28 ENCOUNTER — PATIENT OUTREACH (OUTPATIENT)
Dept: ADMINISTRATIVE | Facility: HOSPITAL | Age: 64
End: 2024-06-28
Payer: COMMERCIAL

## 2024-06-28 NOTE — PROGRESS NOTES
VBHM Score: 3     Colon Cancer Screening  Hemoglobin A1c  LDCT Lung Screen    Tetanus Vaccine  Shingles/Zoster Vaccine  RSV Vaccine            Health Maintenance Topic(s) Outreach Outcomes & Actions Taken:    Lab(s) - Outreach Outcomes & Actions Taken  : no answer    Primary Care Appt - Outreach Outcomes & Actions Taken  : no answer         Additional Notes:  Attempted to contact pt to schedule follow up diabetes.   No answer or voice mail.           Care Management, Digital Medicine, and/or Education Referrals    OPCM Risk Score: 39.1         Next Steps - Referral Actions: Digital Medicine Outcomes and Actions Taken: Pt Declined or Not Eligible        Additional Notes:  Not eligible for Dig Med.  No referrals placed.

## 2024-07-28 ENCOUNTER — PATIENT MESSAGE (OUTPATIENT)
Dept: ADMINISTRATIVE | Facility: HOSPITAL | Age: 64
End: 2024-07-28
Payer: COMMERCIAL

## 2024-07-29 DIAGNOSIS — E11.9 TYPE 2 DIABETES MELLITUS WITHOUT COMPLICATION, UNSPECIFIED WHETHER LONG TERM INSULIN USE: ICD-10-CM

## 2024-09-06 ENCOUNTER — TELEPHONE (OUTPATIENT)
Dept: OPHTHALMOLOGY | Facility: CLINIC | Age: 64
End: 2024-09-06
Payer: COMMERCIAL

## 2024-09-23 ENCOUNTER — PATIENT OUTREACH (OUTPATIENT)
Dept: ADMINISTRATIVE | Facility: HOSPITAL | Age: 64
End: 2024-09-23
Payer: COMMERCIAL

## 2024-09-23 NOTE — LETTER
[id number]    AUTHORIZATION FOR RELEASE OF   CONFIDENTIAL INFORMATION    Dear RAUDEL Avila,    We are seeing Mallika Corbin, date of birth 1960, in the clinic at [facility]. Shantel Chairez MD is the patient's PCP. Mallika Corbin has an outstanding lab/procedure at the time we reviewed her chart. In order to help keep her health information updated, she has authorized us to request the following medical record(s):            ( X ) 2024 HEMOGLOBIN A1C           Please fax records to [phone]     If you have any questions, please contact    DAMION Matthew at [phone]          Patient Name: Mallika Corbin  : 1960  Patient Phone #: [phone]

## 2024-09-27 ENCOUNTER — PATIENT OUTREACH (OUTPATIENT)
Dept: ADMINISTRATIVE | Facility: HOSPITAL | Age: 64
End: 2024-09-27
Payer: COMMERCIAL

## 2024-10-09 ENCOUNTER — PATIENT OUTREACH (OUTPATIENT)
Dept: ADMINISTRATIVE | Facility: HOSPITAL | Age: 64
End: 2024-10-09
Payer: COMMERCIAL

## 2024-10-09 NOTE — LETTER
AUTHORIZATION FOR RELEASE OF   CONFIDENTIAL INFORMATION        We are seeing Mallika Corbin, date of birth 1960, in the clinic at [facility]. Shantel Chairez MD is the patient's PCP. Mallika Corbin has an outstanding lab/procedure at the time we reviewed her chart. In order to help keep her health information updated, she has authorized us to request the following medical record(s):                                      ( X )  OUTSIDE LAB RESULTS Hemoglobin A1c           Please fax records to [facility], [place], Brandi J., MD,  at [phone] or email to [email or web address].              Patient Name: Mallika Corbin  : 1960  Patient Phone #: [phone]

## 2024-11-10 ENCOUNTER — TELEPHONE (OUTPATIENT)
Dept: PHARMACY | Facility: CLINIC | Age: 64
End: 2024-11-10
Payer: COMMERCIAL

## 2024-11-10 NOTE — TELEPHONE ENCOUNTER
[facility]/Population Health Chart Review & Patient Outreach Details For Medication Adherence Project    Reason for Outreach Encounter: 3rd Party payor non-compliance report ([employer], BCBS, C, etc)  2.  Patient Outreach Method: [employer] message  3.   Medication in question: pravastatin    LAST FILLED: 5/13/24 for 90 day supply  Hyperlipidemia Medications               pravastatin (PRAVACHOL) 40 MG tablet Take 40 mg by mouth.               4.  Reviewed and or Updates Made To: Patient Chart  5. Outreach Outcomes and/or actions taken: Sent inquiry to patient: Waiting for response.

## 2024-11-19 ENCOUNTER — PATIENT MESSAGE (OUTPATIENT)
Dept: NEUROLOGY | Facility: CLINIC | Age: 64
End: 2024-11-19
Payer: COMMERCIAL

## 2024-12-05 ENCOUNTER — TELEPHONE (OUTPATIENT)
Dept: PHARMACY | Facility: CLINIC | Age: 64
End: 2024-12-05
Payer: COMMERCIAL

## 2024-12-05 NOTE — TELEPHONE ENCOUNTER
[facility]/Population Health Chart Review & Patient Outreach Details For Medication Adherence Project    Reason for Outreach Encounter: 3rd Party payor non-compliance report ([employer], BCBS, UHC, etc)  2.  Patient Outreach Method: Reviewed patient chart  and [employer] message  3.   Medication in question:   Diabetes Medications               empagliflozin (JARDIANCE) 25 mg tablet Take 1 tablet (25 mg total) by mouth once daily.    semaglutide (OZEMPIC) 1 mg/dose (4 mg/3 mL) Inject 1 mg into the skin every 7 days.                 Jardiance  - no adherence data  Ozempic  last filled 6/14/24 for 84 day supply  Glipizide er dc'ed    4.  Reviewed and or Updates Made To: Patient Chart  5. Outreach Outcomes and/or actions taken: Medication discontinued and Sent inquiry to patient: Waiting for response  Additional Notes:

## 2024-12-11 DIAGNOSIS — E11.9 TYPE 2 DIABETES MELLITUS WITHOUT COMPLICATION: ICD-10-CM

## 2024-12-17 ENCOUNTER — TELEPHONE (OUTPATIENT)
Dept: PHARMACY | Facility: CLINIC | Age: 64
End: 2024-12-17
Payer: COMMERCIAL

## 2024-12-18 NOTE — TELEPHONE ENCOUNTER
[facility]/Population Health Chart Review & Patient Outreach Details For Medication Adherence Project    Reason for Outreach Encounter: 3rd Party payor non-compliance report ([employer], BCBS, C, etc)  2.  Patient Outreach Method: [employer] message  3.   Medication in question: pravastatin   LAST FILLED: 5/13/24 for 90 day supply  Hyperlipidemia Medications               pravastatin (PRAVACHOL) 40 MG tablet Take 40 mg by mouth.               4.  Reviewed and or Updates Made To: Patient Chart  5. Outreach Outcomes and/or actions taken: Sent inquiry to patient: Waiting for response.

## 2025-01-13 ENCOUNTER — TELEPHONE (OUTPATIENT)
Dept: PHARMACY | Facility: CLINIC | Age: 65
End: 2025-01-13
Payer: COMMERCIAL

## 2025-01-13 NOTE — TELEPHONE ENCOUNTER
[facility]/Population Health Chart Review & Patient Outreach Details For Medication Adherence Project    Reason for Outreach Encounter: 3rd Party payor non-compliance report ([employer], BCBS, C, etc)  2.  Patient Outreach Method: Reviewed patient chart   3.   Medication in question:    Diabetes Medications               empagliflozin (JARDIANCE) 25 mg tablet Take 1 tablet (25 mg total) by mouth once daily.    semaglutide (OZEMPIC) 1 mg/dose (4 mg/3 mL) Inject 1 mg into the skin every 7 days.              Glipizide d/c    4.  Reviewed and or Updates Made To: Patient Chart  5. Outreach Outcomes and/or actions taken: Medication discontinued  Additional Notes:

## 2025-01-15 ENCOUNTER — TELEPHONE (OUTPATIENT)
Dept: PHARMACY | Facility: CLINIC | Age: 65
End: 2025-01-15
Payer: COMMERCIAL

## 2025-01-15 NOTE — TELEPHONE ENCOUNTER
[facility]/Population [facility] Chart Review & Patient Outreach Details For Medication Adherence Project    Reason for Outreach Encounter: 3rd Party payor non-compliance report ([employer], BCBS, C, etc)  2.  Patient Outreach Method: Reviewed patient chart   3.   Medication in question:    Hypertension Medications               valsartan (DIOVAN) 40 MG tablet Take 40 mg by mouth once daily.                 valsartan  last filled  12/23 for 90 day supply      4.  Reviewed and or Updates Made To: Patient Chart  5. Outreach Outcomes and/or actions taken: Patient filled medication and is on track to be adherent  Additional Notes:

## 2025-02-27 ENCOUNTER — PATIENT OUTREACH (OUTPATIENT)
Dept: ADMINISTRATIVE | Facility: HOSPITAL | Age: 65
End: 2025-02-27
Payer: COMMERCIAL

## 2025-02-27 NOTE — PROGRESS NOTES
VBHM Score: 9     Colon Cancer Screening  Urine Screening  Eye Exam  Hemoglobin A1c  Lipid Panel  Mammogram  Foot Exam  Uncontrolled BP  LDCT Lung Screen    Influenza Vaccine  Tetanus Vaccine  Shingles/Zoster Vaccine  RSV Vaccine            LVM for return call and sent portal message. Patient is due for annual visit as well.

## 2025-03-06 ENCOUNTER — PATIENT MESSAGE (OUTPATIENT)
Dept: ADMINISTRATIVE | Facility: HOSPITAL | Age: 65
End: 2025-03-06
Payer: COMMERCIAL

## 2025-04-18 ENCOUNTER — TELEPHONE (OUTPATIENT)
Dept: PHARMACY | Facility: CLINIC | Age: 65
End: 2025-04-18
Payer: COMMERCIAL

## 2025-04-18 NOTE — TELEPHONE ENCOUNTER
[facility]/Population [facility] Chart Review & Patient Outreach Details For Medication Adherence Project    Reason for Outreach Encounter: 3rd Party payor non-compliance report ([employer], BCBS, C, etc)  2.  Patient Outreach Method: Reviewed patient chart  and [employer] message  3.   Medication in question:    Hypertension Medications              valsartan (DIOVAN) 40 MG tablet Take 40 mg by mouth once daily.                 valsartan  last filled  12/23 for 90 day supply      4.  Reviewed and or Updates Made To: Patient Chart  5. Outreach Outcomes and/or actions taken: Sent inquiry to patient: Waiting for response  Additional Notes:

## 2025-04-22 ENCOUNTER — PATIENT MESSAGE (OUTPATIENT)
Dept: ADMINISTRATIVE | Facility: HOSPITAL | Age: 65
End: 2025-04-22
Payer: COMMERCIAL

## 2025-04-25 ENCOUNTER — TELEPHONE (OUTPATIENT)
Dept: PHARMACY | Facility: CLINIC | Age: 65
End: 2025-04-25
Payer: COMMERCIAL

## 2025-04-25 NOTE — TELEPHONE ENCOUNTER
[facility]Population [facility] Chart Review & Patient Outreach Details For Medication Adherence Project    Reason for Outreach Encounter: 3rd Party payor non-compliance report ([employer], BCBS, UHC, etc)  2.  Patient Outreach Method: Reviewed patient chart  and [employer] message  3.   Medication in question:    Hyperlipidemia Medications              pravastatin (PRAVACHOL) 40 MG tablet Take 40 mg by mouth.                  pravastatin  last filled  12/23 for 90 day supply      4.  Reviewed and or Updates Made To: Patient Chart  5. Outreach Outcomes and/or actions taken: Sent inquiry to patient: Waiting for response  Additional Notes:

## 2025-04-29 ENCOUNTER — PATIENT OUTREACH (OUTPATIENT)
Dept: ADMINISTRATIVE | Facility: HOSPITAL | Age: 65
End: 2025-04-29
Payer: COMMERCIAL

## 2025-05-07 ENCOUNTER — PATIENT OUTREACH (OUTPATIENT)
Dept: ADMINISTRATIVE | Facility: HOSPITAL | Age: 65
End: 2025-05-07
Payer: COMMERCIAL

## 2025-05-07 NOTE — PROGRESS NOTES
[facility] patients needing a yearly visit REPORT:Attempted to contact patient, no answer, left voicemail.

## 2025-05-09 ENCOUNTER — PATIENT OUTREACH (OUTPATIENT)
Dept: ADMINISTRATIVE | Facility: HOSPITAL | Age: 65
End: 2025-05-09
Payer: COMMERCIAL

## 2025-05-17 ENCOUNTER — TELEPHONE (OUTPATIENT)
Dept: PHARMACY | Facility: CLINIC | Age: 65
End: 2025-05-17
Payer: COMMERCIAL

## 2025-05-17 NOTE — TELEPHONE ENCOUNTER
[facility]Population [facility] Chart Review & Patient Outreach Details For Medication Adherence Project    Reason for Outreach Encounter: 3rd Party payor non-compliance report ([employer], BCBS, [facility], etc)  2.  Patient Outreach Method: [employer] message  3.   Medication in question: valsartan    LAST FILLED: 12/23/24 for 90 day supply  Hypertension Medications              valsartan (DIOVAN) 40 MG tablet Take 40 mg by mouth once daily.              4.  Reviewed and or Updates Made To: Patient Chart  5. Outreach Outcomes and/or actions taken: Sent inquiry to patient: Waiting for response.

## 2025-05-25 ENCOUNTER — TELEPHONE (OUTPATIENT)
Dept: PHARMACY | Facility: CLINIC | Age: 65
End: 2025-05-25
Payer: COMMERCIAL

## 2025-05-25 NOTE — TELEPHONE ENCOUNTER
[facility]Population [facility] Chart Review & Patient Outreach Details For Medication Adherence Project    Reason for Outreach Encounter: 3rd Party payor non-compliance report ([employer], BCBS, UHC, etc)  2.  Patient Outreach Method: Reviewed patient chart   3.   Medication in question:    Hyperlipidemia Medications              pravastatin (PRAVACHOL) 40 MG tablet Take 40 mg by mouth.                  LF 90 ds 5/4/25    4.  Reviewed and or Updates Made To: Patient Chart  5. Outreach Outcomes and/or actions taken: Patient filled medication and is on track to be adherent  Additional Notes:

## 2025-05-29 ENCOUNTER — PATIENT OUTREACH (OUTPATIENT)
Dept: ADMINISTRATIVE | Facility: HOSPITAL | Age: 65
End: 2025-05-29
Payer: COMMERCIAL

## 2025-05-29 NOTE — PROGRESS NOTES
VBHM Score: 6     Colon Cancer Screening  Urine Screening  Hemoglobin A1c  Mammogram  Foot Exam  Uncontrolled BP    Tetanus Vaccine  Shingles/Zoster Vaccine  RSV Vaccine            LVM for return call. Sent portal message.

## 2025-06-16 ENCOUNTER — TELEPHONE (OUTPATIENT)
Dept: PHARMACY | Facility: CLINIC | Age: 65
End: 2025-06-16
Payer: COMMERCIAL

## 2025-06-16 NOTE — TELEPHONE ENCOUNTER
[facility]/Population [facility] Chart Review & Patient Outreach Details For Medication Adherence Project    Reason for Outreach Encounter: 3rd Party payor non-compliance report ([employer], BCBS, C, etc)  2.  Patient Outreach Method: Reviewed Patient Chart  3.   Medication in question: Valsartan 80mg   LAST FILLED: 3/25/25 for 90 day supply  Hypertension Medications              valsartan (DIOVAN) 40 MG tablet Take 40 mg by mouth once daily.              4.  Reviewed and or Updates Made To: Patient Chart  5. Outreach Outcomes and/or actions taken: Patient filled medication and is on track to be adherent

## 2025-06-17 ENCOUNTER — PATIENT OUTREACH (OUTPATIENT)
Dept: ADMINISTRATIVE | Facility: HOSPITAL | Age: 65
End: 2025-06-17
Payer: COMMERCIAL

## 2025-06-17 NOTE — LETTER
[id number]    AUTHORIZATION FOR RELEASE OF   CONFIDENTIAL INFORMATION    Dear RAUDEL Moreno,    We are seeing Mallika Corbin, date of birth 1960, in the clinic at [facility]. Shantel Chairez MD is the patient's PCP. Mallika Corbin has an outstanding lab/procedure at the time we reviewed her chart. In order to help keep her health information updated, she has authorized us to request the following medical record(s):            ( x ) MOST RECENT HEMOGLOBIN A1C RESULT          Please fax/email records to:  Fax #: [phone]  Email: [email or web address]     If you have any questions, please contact    DAMION Matthew @ [phone]        Patient Name: Mallika Corbin  : 1960  Patient Phone #: [phone]

## 2025-07-25 ENCOUNTER — HOSPITAL ENCOUNTER (OUTPATIENT)
Dept: RADIOLOGY | Facility: HOSPITAL | Age: 65
Discharge: HOME | End: 2025-07-25
Attending: INTERNAL MEDICINE
Payer: COMMERCIAL

## 2025-07-25 VITALS — WEIGHT: 171.06 LBS | HEIGHT: 63 IN | BODY MASS INDEX: 30.31 KG/M2

## 2025-07-25 DIAGNOSIS — Z12.31 ENCOUNTER FOR SCREENING MAMMOGRAM FOR BREAST CANCER: ICD-10-CM

## 2025-07-25 PROCEDURE — 77067 SCR MAMMO BI INCL CAD: CPT | Mod: 26,,, | Performed by: RADIOLOGY

## 2025-07-25 PROCEDURE — 77067 SCR MAMMO BI INCL CAD: CPT | Mod: TC

## 2025-07-25 PROCEDURE — 77063 BREAST TOMOSYNTHESIS BI: CPT | Mod: 26,,, | Performed by: RADIOLOGY

## 2025-07-28 ENCOUNTER — TELEPHONE (OUTPATIENT)
Dept: PHARMACY | Facility: CLINIC | Age: 65
End: 2025-07-28
Payer: COMMERCIAL

## 2025-07-29 ENCOUNTER — PATIENT MESSAGE (OUTPATIENT)
Dept: ADMINISTRATIVE | Facility: HOSPITAL | Age: 65
End: 2025-07-29
Payer: COMMERCIAL

## 2025-07-29 NOTE — TELEPHONE ENCOUNTER
[facility]/Population Health Chart Review & Patient Outreach Details For Medication Adherence Project    Reason for Outreach Encounter: 3rd Party payor non-compliance report ([employer], BCBS, UHC, etc)  2.  Patient Outreach Method: Reviewed Patient Chart  3.   Medication in question: valsartan 40 mg   LAST FILLED: n/a  Hypertension Medications              valsartan (DIOVAN) 40 MG tablet Take 40 mg by mouth once daily.              4.  Reviewed and or Updates Made To: Patient Chart  5. Outreach Outcomes and/or actions taken: Medication discontinued    Additional Notes:     Dc by cardiology

## 2025-07-30 ENCOUNTER — PATIENT OUTREACH (OUTPATIENT)
Dept: ADMINISTRATIVE | Facility: HOSPITAL | Age: 65
End: 2025-07-30
Payer: COMMERCIAL

## 2025-07-30 NOTE — LETTER
AUTHORIZATION FOR RELEASE OF   CONFIDENTIAL INFORMATION      We are seeing Mallika Corbin, date of birth 1960, in the clinic at [facility]. Shantel Chairez MD is the patient's PCP. Mallika Corbin has an outstanding lab/procedure at the time we reviewed her chart. In order to help keep her health information updated, she has authorized us to request the following medical record(s):        (  )  MAMMOGRAM                                      (  )  COLONOSCOPY      (  )  PAP SMEAR                                          ( X )  OUTSIDE LAB RESULTS     (  )  DEXA SCAN                                          (  )  EYE EXAM            (  )  FOOT EXAM                                          (  )  ENTIRE RECORD     (  )  OUTSIDE IMMUNIZATIONS                 (  )  _______________        Please fax/Email records to:  Fax: [phone]  Email: [email or web address]    Deb Lagunas [facility]  Care Coordination Department  [facility]  Phone: [phone]       Patient Name: Mallika Corbin  : 1960  Patient Phone #: [phone]

## 2025-07-30 NOTE — PROGRESS NOTES
Working Hypertension REPORT:  Attempted to contact patient, no answer, left voicemail.     Labs completed at [facility] on 6/2025, results not available, will request.     [facility] LABS faxed to [facility] 1x to request. Reminder set.

## 2025-08-11 ENCOUNTER — TELEPHONE (OUTPATIENT)
Dept: PHARMACY | Facility: CLINIC | Age: 65
End: 2025-08-11
Payer: COMMERCIAL

## 2025-08-29 ENCOUNTER — PATIENT MESSAGE (OUTPATIENT)
Dept: ADMINISTRATIVE | Facility: HOSPITAL | Age: 65
End: 2025-08-29
Payer: COMMERCIAL

## 2025-09-03 ENCOUNTER — PATIENT OUTREACH (OUTPATIENT)
Dept: ADMINISTRATIVE | Facility: HOSPITAL | Age: 65
End: 2025-09-03
Payer: COMMERCIAL